# Patient Record
Sex: FEMALE | Race: WHITE | NOT HISPANIC OR LATINO | Employment: FULL TIME | URBAN - METROPOLITAN AREA
[De-identification: names, ages, dates, MRNs, and addresses within clinical notes are randomized per-mention and may not be internally consistent; named-entity substitution may affect disease eponyms.]

---
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
## 2018-06-12 ENCOUNTER — OFFICE VISIT (OUTPATIENT)
Dept: URGENT CARE | Facility: MEDICAL CENTER | Age: 18
End: 2018-06-12
Payer: COMMERCIAL

## 2018-06-12 VITALS
OXYGEN SATURATION: 98 % | SYSTOLIC BLOOD PRESSURE: 102 MMHG | WEIGHT: 147.6 LBS | HEART RATE: 70 BPM | RESPIRATION RATE: 20 BRPM | TEMPERATURE: 98 F | DIASTOLIC BLOOD PRESSURE: 68 MMHG

## 2018-06-12 DIAGNOSIS — R39.9 UTI SYMPTOMS: Primary | ICD-10-CM

## 2018-06-12 LAB
SL AMB  POCT GLUCOSE, UA: ABNORMAL
SL AMB LEUKOCYTE ESTERASE,UA: ABNORMAL
SL AMB POCT BILIRUBIN,UA: ABNORMAL
SL AMB POCT BLOOD,UA: ABNORMAL
SL AMB POCT CLARITY,UA: ABNORMAL
SL AMB POCT COLOR,UA: ABNORMAL
SL AMB POCT KETONES,UA: ABNORMAL
SL AMB POCT NITRITE,UA: ABNORMAL
SL AMB POCT PH,UA: 6
SL AMB POCT SPECIFIC GRAVITY,UA: 1.02
SL AMB POCT URINE HCG: NORMAL
SL AMB POCT URINE PROTEIN: ABNORMAL
SL AMB POCT UROBILINOGEN: 0.2

## 2018-06-12 PROCEDURE — 87086 URINE CULTURE/COLONY COUNT: CPT

## 2018-06-12 PROCEDURE — 99283 EMERGENCY DEPT VISIT LOW MDM: CPT

## 2018-06-12 PROCEDURE — 87077 CULTURE AEROBIC IDENTIFY: CPT

## 2018-06-12 PROCEDURE — 87186 SC STD MICRODIL/AGAR DIL: CPT

## 2018-06-12 PROCEDURE — G0382 LEV 3 HOSP TYPE B ED VISIT: HCPCS

## 2018-06-12 RX ORDER — SULFAMETHOXAZOLE AND TRIMETHOPRIM 800; 160 MG/1; MG/1
1 TABLET ORAL EVERY 12 HOURS SCHEDULED
Qty: 6 TABLET | Refills: 0 | Status: SHIPPED | OUTPATIENT
Start: 2018-06-12 | End: 2018-06-15

## 2018-06-12 RX ORDER — MEDROXYPROGESTERONE ACETATE 150 MG/ML
150 INJECTION, SUSPENSION INTRAMUSCULAR
COMMUNITY
Start: 2017-12-13 | End: 2018-12-08

## 2018-06-12 NOTE — PATIENT INSTRUCTIONS
Take antibiotic as directed  Eat yogurt to avoid GI upset  Increase fluid intake  Avoid holding bladder for prolonged periods  Urinate after intercourse  Always wipe front to back  Go to the ER for worsening symptoms: flank pain, fever/chills, nausea/vomiting, lower back pain or any other concerning symptoms

## 2018-06-12 NOTE — PROGRESS NOTES
[facility]        NAME: Ac Allen is a 19 y o  female  : 2000    MRN: [id number]      Assessment and Plan   UTI symptoms [R39 9]  1  UTI symptoms  Urine culture    POCT urine HCG    sulfamethoxazole-trimethoprim (BACTRIM DS) 800-160 mg per tablet         Patient Instructions     Patient Instructions   Take antibiotic as directed  Eat yogurt to avoid GI upset  Increase fluid intake  Avoid holding bladder for prolonged periods  Urinate after intercourse  Always wipe front to back  Go to the ER for worsening symptoms: flank pain, fever/chills, nausea/vomiting, lower back pain or any other concerning symptoms  Follow up with PCP in 3-5 days  Proceed to  ER if symptoms worsen  Chief Complaint     Chief Complaint   Patient presents with    Possible UTI     Pressure, hematuria, urgency, X 2 days  Is premenstral          History of Present Illness       Urinary Tract Infection    This is a new problem  The current episode started in the past 7 days  The problem occurs every urination  The problem has been unchanged  Quality: pressure in lower abdomen  The pain is mild  There has been no fever  She is sexually active (One partner of one year  No sympotms in partner)  There is no history of pyelonephritis  Associated symptoms include frequency, hematuria and urgency  Pertinent negatives include no chills, discharge, flank pain, hesitancy, nausea, possible pregnancy, sweats or vomiting  Associated symptoms comments: Pt is on depo shot  Reports she just has occasional spotting    There is no history of kidney stones, recurrent UTIs or a single kidney  Review of Systems   Review of Systems   Constitutional: Negative for chills  Respiratory: Negative  Cardiovascular: Negative  Gastrointestinal: Negative for nausea and vomiting  Genitourinary: Positive for frequency, hematuria and urgency  Negative for flank pain and hesitancy           Current Medications       Current Outpatient Prescriptions:     medroxyPROGESTERone (DEPO-PROVERA) 150 mg/mL injection, Inject 150 mg into the shoulder, thigh, or buttocks, Disp: , Rfl:     sulfamethoxazole-trimethoprim (BACTRIM DS) 800-160 mg per tablet, Take 1 tablet by mouth every 12 (twelve) hours for 3 days, Disp: 6 tablet, Rfl: 0    Current Allergies     Allergies as of 06/12/2018 - Reviewed 08/07/2017   Allergen Reaction Noted    Amoxicillin Hives 08/17/2016            The following portions of the patient's history were reviewed and updated as appropriate: allergies, current medications, past family history, past medical history, past social history, past surgical history and problem list      History reviewed  No pertinent past medical history  History reviewed  No pertinent surgical history  Family History   Problem Relation Age of Onset    No Known Problems Mother     No Known Problems Father          Medications have been verified  Objective   /68 (BP Location: Left arm, Patient Position: Sitting, Cuff Size: Standard)   Pulse 70   Temp 98 °F (36 7 °C) (Temporal)   Resp 20   Wt 67 kg (147 lb 9 6 oz)   SpO2 98%      Urine HCG: negative  Urine dip: pos leukocytes and nitrates  ( see labs)     Physical Exam     Physical Exam   Constitutional: She is oriented to person, place, and time  She appears well-developed and well-nourished  Cardiovascular: Normal rate, regular rhythm and normal heart sounds  No murmur heard  Pulmonary/Chest: Effort normal and breath sounds normal  No respiratory distress  Abdominal: Soft  Normal appearance and bowel sounds are normal  She exhibits no distension  There is tenderness in the suprapubic area  There is no rigidity, no rebound, no guarding, no CVA tenderness, no tenderness at McBurney's point and negative Trinidad's sign  Neurological: She is alert and oriented to person, place, and time  Psychiatric: She has a normal mood and affect     Nursing note and vitals reviewed

## 2018-06-14 LAB — BACTERIA UR CULT: ABNORMAL

## 2018-06-15 ENCOUNTER — TELEPHONE (OUTPATIENT)
Dept: URGENT CARE | Facility: MEDICAL CENTER | Age: 18
End: 2018-06-15

## 2018-06-18 ENCOUNTER — TELEPHONE (OUTPATIENT)
Dept: URGENT CARE | Facility: MEDICAL CENTER | Age: 18
End: 2018-06-18

## 2018-06-18 RX ORDER — NITROFURANTOIN 25; 75 MG/1; MG/1
100 CAPSULE ORAL 2 TIMES DAILY
Qty: 14 CAPSULE | Refills: 0 | Status: SHIPPED | OUTPATIENT
Start: 2018-06-18 | End: 2018-06-25

## 2018-06-18 NOTE — TELEPHONE ENCOUNTER
Patient called states she had 3 days of antibiotics but still had the symptoms   A prescription for macrobid sent

## 2018-08-22 ENCOUNTER — APPOINTMENT (OUTPATIENT)
Dept: URGENT CARE | Facility: MEDICAL CENTER | Age: 18
End: 2018-08-22
Payer: OTHER MISCELLANEOUS

## 2018-08-22 ENCOUNTER — APPOINTMENT (OUTPATIENT)
Dept: RADIOLOGY | Facility: MEDICAL CENTER | Age: 18
End: 2018-08-22
Payer: OTHER MISCELLANEOUS

## 2018-08-22 DIAGNOSIS — M25.561 ACUTE PAIN OF RIGHT KNEE: ICD-10-CM

## 2018-08-22 DIAGNOSIS — M25.561 ACUTE PAIN OF RIGHT KNEE: Primary | ICD-10-CM

## 2018-08-22 PROCEDURE — 73564 X-RAY EXAM KNEE 4 OR MORE: CPT

## 2018-08-22 PROCEDURE — G0382 LEV 3 HOSP TYPE B ED VISIT: HCPCS

## 2018-08-22 PROCEDURE — 99283 EMERGENCY DEPT VISIT LOW MDM: CPT

## 2018-09-04 ENCOUNTER — APPOINTMENT (OUTPATIENT)
Dept: URGENT CARE | Facility: MEDICAL CENTER | Age: 18
End: 2018-09-04

## 2018-09-17 ENCOUNTER — APPOINTMENT (OUTPATIENT)
Dept: URGENT CARE | Facility: MEDICAL CENTER | Age: 18
End: 2018-09-17
Payer: OTHER MISCELLANEOUS

## 2018-09-17 PROCEDURE — 99213 OFFICE O/P EST LOW 20 MIN: CPT | Performed by: PHYSICIAN ASSISTANT

## 2018-09-21 ENCOUNTER — APPOINTMENT (OUTPATIENT)
Dept: OCCUPATIONAL MEDICINE | Facility: CLINIC | Age: 18
End: 2018-09-21
Payer: OTHER MISCELLANEOUS

## 2018-09-21 PROCEDURE — 99213 OFFICE O/P EST LOW 20 MIN: CPT | Performed by: PREVENTIVE MEDICINE

## 2018-11-07 ENCOUNTER — OFFICE VISIT (OUTPATIENT)
Dept: URGENT CARE | Facility: MEDICAL CENTER | Age: 18
End: 2018-11-07

## 2018-11-07 VITALS
HEIGHT: 66 IN | DIASTOLIC BLOOD PRESSURE: 73 MMHG | HEART RATE: 80 BPM | SYSTOLIC BLOOD PRESSURE: 110 MMHG | BODY MASS INDEX: 22.5 KG/M2 | OXYGEN SATURATION: 100 % | WEIGHT: 140 LBS | TEMPERATURE: 98.1 F | RESPIRATION RATE: 20 BRPM

## 2018-11-07 DIAGNOSIS — J06.9 UPPER RESPIRATORY INFECTION, VIRAL: Primary | ICD-10-CM

## 2018-11-07 RX ORDER — NAPROXEN 500 MG/1
TABLET ORAL
COMMUNITY
Start: 2018-09-04 | End: 2020-04-07

## 2018-11-07 RX ORDER — FLUTICASONE PROPIONATE 50 MCG
1 SPRAY, SUSPENSION (ML) NASAL DAILY
Qty: 16 G | Refills: 0 | Status: SHIPPED | OUTPATIENT
Start: 2018-11-07

## 2018-11-07 RX ORDER — CYCLOBENZAPRINE HCL 5 MG
TABLET ORAL
COMMUNITY
Start: 2018-08-22 | End: 2020-04-07

## 2018-11-07 RX ORDER — PSEUDOEPHEDRINE HCL 60 MG/1
60 TABLET ORAL EVERY 6 HOURS PRN
Qty: 30 TABLET | Refills: 0 | Status: SHIPPED | OUTPATIENT
Start: 2018-11-07 | End: 2020-04-07

## 2018-11-07 NOTE — LETTER
November 7, 2018     Patient: Carol Ann Sauceda   YOB: 2000   Date of Visit: 11/7/2018       To Whom it May Concern:    CarolA nn Sauceda is under my professional care  She was seen in my office on 11/7/2018  She  may return to work when she is fever and symptom free  If you have any questions or concerns, please don't hesitate to call           Sincerely,          Nafisa Arias PA-C        CC: No Recipients

## 2018-11-07 NOTE — PROGRESS NOTES
[facility]        NAME: David Tirado is a 19 y o  female  : 2000    MRN: [id number]  DATE: 2018  TIME: 11:47 AM    Assessment and Plan   Upper respiratory infection, viral [J06 9]  1  Upper respiratory infection, viral  pseudoephedrine (SUDAFED) 60 mg tablet    fluticasone (FLONASE) 50 mcg/act nasal spray         Patient Instructions   Prescription sent to pharmacy for pseudoephedrine and Flonase-use as directed  Saline nasal spray, cool mist humidifier, rest, fluids  Tylenol/ibuprofen as needed for fever or pain  Follow up with PCP in 3-5 days  Proceed to  ER if symptoms worsen  Chief Complaint     Chief Complaint   Patient presents with    Sinusitis     started with facial pressure last night ,          History of Present Illness   The patient is a 43-year-old female who presents with sinus congestion and pressure and other cold symptoms for 1 day  Positive nasal and sinus congestion with thick nasal drainage  Sinus pressure and pain bilaterally  Positive headache  She denies dizziness  Negative jaw or dental pain  Positive nonproductive cough  Negative shortness of breath or wheezing  Negative abdominal pain, nausea, vomiting or diarrhea  Negative fever or chills  Negative myalgias  HPI    Review of Systems   Review of Systems   Constitutional: Negative for activity change, chills, fatigue and fever  HENT: Positive for congestion, postnasal drip, rhinorrhea, sinus pain, sinus pressure and sore throat  Negative for ear discharge, ear pain, facial swelling, mouth sores, sneezing and trouble swallowing  Eyes: Negative for pain, discharge, redness and itching  Respiratory: Positive for cough  Negative for apnea, chest tightness, shortness of breath, wheezing and stridor  Cardiovascular: Negative for chest pain  Gastrointestinal: Negative for abdominal distention, abdominal pain, diarrhea, nausea and vomiting     Genitourinary: Negative for difficulty urinating  Musculoskeletal: Negative for arthralgias and myalgias  Skin: Negative for pallor and rash  Allergic/Immunologic: Negative  Neurological: Positive for headaches  Negative for dizziness and light-headedness  Hematological: Negative  Psychiatric/Behavioral: Negative  All other systems reviewed and are negative  Current Medications       Current Outpatient Prescriptions:     cyclobenzaprine (FLEXERIL) 5 mg tablet, , Disp: , Rfl:     fluticasone (FLONASE) 50 mcg/act nasal spray, 1 spray into each nostril daily, Disp: 16 g, Rfl: 0    medroxyPROGESTERone (DEPO-PROVERA) 150 mg/mL injection, Inject 150 mg into the shoulder, thigh, or buttocks, Disp: , Rfl:     naproxen (NAPROSYN) 500 mg tablet, , Disp: , Rfl:     pseudoephedrine (SUDAFED) 60 mg tablet, Take 1 tablet (60 mg total) by mouth every 6 (six) hours as needed for congestion, Disp: 30 tablet, Rfl: 0    Current Allergies     Allergies as of 11/07/2018 - Reviewed 11/07/2018   Allergen Reaction Noted    Amoxicillin Hives 08/17/2016            The following portions of the patient's history were reviewed and updated as appropriate: allergies, current medications, past family history, past medical history, past social history, past surgical history and problem list      History reviewed  No pertinent past medical history  History reviewed  No pertinent surgical history  Family History   Problem Relation Age of Onset    No Known Problems Mother     No Known Problems Father          Medications have been verified  Objective   /73   Pulse 80   Temp 98 1 °F (36 7 °C) (Temporal)   Resp 20   Ht 5' 5 5" (1 664 m)   Wt 63 5 kg (140 lb)   SpO2 100%   BMI 22 94 kg/m²        Physical Exam     Physical Exam   Constitutional: She is oriented to person, place, and time  She appears well-developed and well-nourished  She appears ill  No distress  HENT:   Head: Normocephalic and atraumatic     Right Ear: Hearing, tympanic membrane, external ear and ear canal normal  No drainage or tenderness  Tympanic membrane is not perforated, not erythematous, not retracted and not bulging  No middle ear effusion  No decreased hearing is noted  Left Ear: Hearing, tympanic membrane, external ear and ear canal normal  No drainage or tenderness  Tympanic membrane is not perforated, not erythematous, not retracted and not bulging  No middle ear effusion  No decreased hearing is noted  Nose: Rhinorrhea present  No mucosal edema or septal deviation  Right sinus exhibits maxillary sinus tenderness and frontal sinus tenderness  Left sinus exhibits maxillary sinus tenderness and frontal sinus tenderness  Mouth/Throat: Uvula is midline and mucous membranes are normal  No oral lesions  No dental abscesses or uvula swelling  Posterior oropharyngeal erythema present  No oropharyngeal exudate, posterior oropharyngeal edema or tonsillar abscesses  Eyes: Pupils are equal, round, and reactive to light  Conjunctivae and EOM are normal    Neck: Trachea normal, normal range of motion and full passive range of motion without pain  Neck supple  No JVD present  No edema and no erythema present  No thyromegaly present  Cardiovascular: Normal rate, regular rhythm, S1 normal, S2 normal, normal heart sounds, intact distal pulses and normal pulses  No murmur heard  Pulmonary/Chest: Effort normal and breath sounds normal  No accessory muscle usage or stridor  No respiratory distress  She has no wheezes  Abdominal: Soft  Normal appearance and bowel sounds are normal  She exhibits no distension  There is no hepatosplenomegaly  There is no tenderness  Musculoskeletal: Normal range of motion  She exhibits no edema  Neurological: She is alert and oriented to person, place, and time  Skin: Skin is warm, dry and intact  No abrasion, no lesion and no rash noted  She is not diaphoretic  No cyanosis or erythema  Nails show no clubbing     Psychiatric: She has a normal mood and affect  Her speech is normal and behavior is normal    Nursing note and vitals reviewed

## 2018-11-07 NOTE — PATIENT INSTRUCTIONS
Prescription sent to pharmacy for pseudoephedrine and Flonase-use as directed  Saline nasal spray, cool mist humidifier, rest, fluids  Tylenol/ibuprofen as needed for fever or pain  Follow up with PCP in 3-5 days  Proceed to  ER if symptoms worsen  Cold Symptoms   AMBULATORY CARE:   Cold symptoms  include sneezing, dry throat, a stuffy nose, headache, watery eyes, and a cough  Your cough may be dry, or you may cough up mucus  You may also have muscle aches, joint pain, and tiredness  Rarely, you may have a fever  Cold symptoms occur from inflammation in your upper respiratory system caused by a virus  Most colds go away without treatment  Seek care immediately if:   · You have increased tiredness and weakness  · You are unable to eat  · Your heart is beating much faster than usual for you  · You see white spots in the back of your throat and your neck is swollen and sore to the touch  · You see pinpoint or larger reddish-purple dots on your skin  Contact your healthcare provider if:   · You have a fever higher than 102°F (38 9°C)  · You have new or worsening shortness of breath  · You have thick nasal drainage for more than 2 days  · Your symptoms do not improve or get worse within 5 days  · You have questions or concerns about your condition or care  Treatment for cold symptoms  may include NSAIDS to decrease muscle aches and fever  Cold medicines may also be given to decrease coughing, nasal stuffiness, sneezing, and a runny nose  Manage your cold symptoms: The following may help relieve cold symptoms, such as a dry throat and congestion:  · Gargle with mouthwash or warm salt water as directed  · Suck on throat lozenges or hard candy  · Use a cold or warm vaporizer or humidifier to ease your breathing  · Rest for at least 2 days and then as needed to decrease tiredness and weakness       · Use petroleum based jelly around your nostrils to decrease irritation from blowing your nose  · Drink plenty of liquids  Liquids will help thin and loosen thick mucus so you can cough it up  Liquids will also keep you hydrated  Ask your healthcare provider which liquids are best for you and how much to drink each day  Prevent the spread of germs  by washing your hands often  You can spread your cold germs to others for at least 3 days after your symptoms start  Do not share items, such as eating utensils  Cover your nose and mouth when you cough or sneeze using the crook of your elbow instead of your hands  Throw used tissues in the garbage  Do not smoke:  Smoking may worsen your symptoms and increase the length of time you feel sick  Talk with your healthcare provider if you need help to stop smoking  Follow up with your healthcare provider as directed:  Write down your questions so you remember to ask them during your visits  © 2017 [employer] Information is for End User's use only and may not be sold, redistributed or otherwise used for commercial purposes  All illustrations and images included in CareNotes® are the copyrighted property of A D A M , Inc  or Jose Balderrama  The above information is an  only  It is not intended as medical advice for individual conditions or treatments  Talk to your doctor, nurse or pharmacist before following any medical regimen to see if it is safe and effective for you

## 2018-11-12 ENCOUNTER — OFFICE VISIT (OUTPATIENT)
Dept: URGENT CARE | Facility: MEDICAL CENTER | Age: 18
End: 2018-11-12
Payer: COMMERCIAL

## 2018-11-12 VITALS
HEIGHT: 65 IN | WEIGHT: 145 LBS | TEMPERATURE: 97.3 F | RESPIRATION RATE: 16 BRPM | SYSTOLIC BLOOD PRESSURE: 102 MMHG | DIASTOLIC BLOOD PRESSURE: 65 MMHG | HEART RATE: 65 BPM | OXYGEN SATURATION: 99 % | BODY MASS INDEX: 24.16 KG/M2

## 2018-11-12 DIAGNOSIS — J01.10 ACUTE NON-RECURRENT FRONTAL SINUSITIS: Primary | ICD-10-CM

## 2018-11-12 PROCEDURE — G0382 LEV 3 HOSP TYPE B ED VISIT: HCPCS | Performed by: PHYSICIAN ASSISTANT

## 2018-11-12 PROCEDURE — 99283 EMERGENCY DEPT VISIT LOW MDM: CPT | Performed by: PHYSICIAN ASSISTANT

## 2018-11-12 RX ORDER — AZITHROMYCIN 250 MG/1
TABLET, FILM COATED ORAL
Qty: 6 TABLET | Refills: 0 | Status: SHIPPED | OUTPATIENT
Start: 2018-11-12 | End: 2018-11-16

## 2018-11-12 NOTE — PROGRESS NOTES
[facility]        NAME: Irene Concepcion is a 19 y o  female  : 2000    MRN: [id number]  DATE: 2018  TIME: 11:48 AM    Assessment and Plan   Acute non-recurrent frontal sinusitis [J01 10]  1  Acute non-recurrent frontal sinusitis           Patient Instructions     Sinusitis  zithromax as directed  Continue flonase  Follow up with PCP in 3-5 days  Proceed to  ER if symptoms worsen  Chief Complaint     Chief Complaint   Patient presents with    Nasal Congestion     x5 days with nasal congestion, post nasal drip and a productive cough (denies wheezing or sob), ears ringing at times  Denies fevers, headaches         History of Present Illness       20 y/o female c/o congestion, sinus pressure and pain  Patient states she was treated for URI 5 days ago but the symptoms have worsen and now has pain to sinuses        Review of Systems   Review of Systems   Constitutional: Positive for fever  Negative for activity change, appetite change, chills, diaphoresis and fatigue  HENT: Positive for congestion, rhinorrhea, sinus pain and sinus pressure  Negative for ear discharge, ear pain, facial swelling, hearing loss, mouth sores, nosebleeds, postnasal drip, sneezing, sore throat and voice change  Respiratory: Positive for cough  Negative for apnea, choking, chest tightness, shortness of breath, wheezing and stridor  Cardiovascular: Negative            Current Medications       Current Outpatient Prescriptions:     fluticasone (FLONASE) 50 mcg/act nasal spray, 1 spray into each nostril daily, Disp: 16 g, Rfl: 0    medroxyPROGESTERone (DEPO-PROVERA) 150 mg/mL injection, Inject 150 mg into the shoulder, thigh, or buttocks, Disp: , Rfl:     pseudoephedrine (SUDAFED) 60 mg tablet, Take 1 tablet (60 mg total) by mouth every 6 (six) hours as needed for congestion, Disp: 30 tablet, Rfl: 0    cyclobenzaprine (FLEXERIL) 5 mg tablet, , Disp: , Rfl:     naproxen (NAPROSYN) 500 mg tablet, , Disp: , Rfl:     Current Allergies     Allergies as of 11/12/2018 - Reviewed 11/12/2018   Allergen Reaction Noted    Amoxicillin Hives 08/17/2016            The following portions of the patient's history were reviewed and updated as appropriate: allergies, current medications, past family history, past medical history, past social history, past surgical history and problem list      History reviewed  No pertinent past medical history  History reviewed  No pertinent surgical history  Family History   Problem Relation Age of Onset    No Known Problems Mother     No Known Problems Father          Medications have been verified  Objective   /65   Pulse 65   Temp (!) 97 3 °F (36 3 °C) (Temporal)   Resp 16   Ht 5' 5" (1 651 m)   Wt 65 8 kg (145 lb)   SpO2 99%   BMI 24 13 kg/m²        Physical Exam     Physical Exam   Constitutional: She appears well-developed and well-nourished  No distress  HENT:   Head: Normocephalic and atraumatic  Right Ear: Hearing, tympanic membrane, external ear and ear canal normal    Left Ear: Hearing, tympanic membrane, external ear and ear canal normal    Nose: Right sinus exhibits maxillary sinus tenderness  Left sinus exhibits maxillary sinus tenderness  Mouth/Throat: Uvula is midline and mucous membranes are normal  No trismus in the jaw  No uvula swelling  Posterior oropharyngeal erythema present  No oropharyngeal exudate or tonsillar abscesses  Neck: Normal range of motion  Neck supple  Cardiovascular: Normal rate, regular rhythm, normal heart sounds and intact distal pulses  Pulmonary/Chest: Effort normal and breath sounds normal    Lymphadenopathy:     She has cervical adenopathy  Skin: She is not diaphoretic

## 2018-11-12 NOTE — PATIENT INSTRUCTIONS
SinusitisRhinosinusitis   WHAT YOU NEED TO KNOW:   Rhinosinusitis (RS) is inflammation of your nose and sinuses  It commonly begins as a virus, often as a common cold  Viruses usually last 7 to 10 days and do not need treatment  When the virus does not get better on its own, you may have bacterial RS  This means that bacteria have begun to grow inside your sinuses  Acute RS lasts less than 4 weeks  Chronic RS lasts 12 weeks or more  Recurrent RS is when you have 4 or more episodes of RS in one year  DISCHARGE INSTRUCTIONS:   Return to the emergency department if:   · Your eye and eyelid are red, swollen, and painful  · You cannot open your eye  · You have double vision or you cannot see  · Your eyeball bulges out or you cannot move your eye  · You are more sleepy than normal or you notice changes in your ability to think, move, or talk  · You have a stiff neck, a fever, or a bad headache  · You have swelling of your forehead or scalp  Contact your healthcare provider if:   · Your symptoms are worse or do not improve after 3 to 5 days of treatment  · You have questions or concerns about your condition or care  Medicines: You may need any of the following:  · Acetaminophen  decreases pain and fever  It is available without a doctor's order  Ask how much to take and how often to take it  Follow directions  Acetaminophen can cause liver damage if not taken correctly  · NSAIDs , such as ibuprofen, help decrease swelling, pain, and fever  This medicine is available with or without a doctor's order  NSAIDs can cause stomach bleeding or kidney problems in certain people  If you take blood thinner medicine, always ask your healthcare provider if NSAIDs are safe for you  Always read the medicine label and follow directions  · Nasal steroid sprays  decrease inflammation in your nose and sinuses  · Decongestants  reduce swelling and drain mucus in the nose and sinuses   They may help you breathe easier  · Antihistamines  dry mucus in the nose and relieve sneezing  · Antibiotics  treat a bacterial infection and may be needed if your symptoms do not improve or they get worse  · Take your medicine as directed  Contact your healthcare provider if you think your medicine is not helping or if you have side effects  Tell him or her if you are allergic to any medicine  Keep a list of the medicines, vitamins, and herbs you take  Include the amounts, and when and why you take them  Bring the list or the pill bottles to follow-up visits  Carry your medicine list with you in case of an emergency  Self-care:   · Rinse your sinuses  Use a sinus rinse device to rinse your nasal passages with a saline (salt water) solution  This will help thin the mucus in your nose and rinse away pollen and dirt  It will also help reduce swelling so you can breathe normally  Ask your healthcare provider how often to do this  · Breathe in steam   Heat a bowl of water until you see steam  Lean over the bowl and make a tent over your head with a large towel  Breathe deeply for about 20 minutes  Be careful not to get too close to the steam or burn yourself  Do this 3 times a day  You can also breathe deeply when you take a hot shower  · Sleep with your head elevated  Place an extra pillow under your head before you go to sleep to help your sinuses drain  · Drink liquids as directed  Ask your healthcare provider how much liquid to drink each day and which liquids are best for you  Liquids will thin the mucus in your nose and help it drain  Avoid drinks that contain alcohol or caffeine  · Do not smoke, and avoid secondhand smoke  Nicotine and other chemicals in cigarettes and cigars can make your symptoms worse  Ask your healthcare provider for information if you currently smoke and need help to quit  E-cigarettes or smokeless tobacco still contain nicotine   Talk to your healthcare provider before you use these products  Follow up with your healthcare provider as directed: Follow up if your symptoms are worse or not better after 3 to 5 days of treatment  Write down your questions so you remember to ask them during your visits  © 2017 [employer] Information is for End User's use only and may not be sold, redistributed or otherwise used for commercial purposes  All illustrations and images included in CareNotes® are the copyrighted property of [employer]  or Jose Balderrama  The above information is an  only  It is not intended as medical advice for individual conditions or treatments  Talk to your doctor, nurse or pharmacist before following any medical regimen to see if it is safe and effective for you     zithromax as directed  Continue flonase  Follow up with PCP in 3-5 days  Proceed to  ER if symptoms worsen

## 2018-11-12 NOTE — LETTER
November 12, 2018     Patient: Moisés Funk   YOB: 2000   Date of Visit: 11/12/2018       To Whom it May Concern:    Moisés Funk was seen in my clinic on 11/12/2018  She may return to work on 11/14/2018  If you have any questions or concerns, please don't hesitate to call           Sincerely,          [facility] [place]        CC: No Recipients

## 2018-12-05 ENCOUNTER — OFFICE VISIT (OUTPATIENT)
Dept: URGENT CARE | Facility: MEDICAL CENTER | Age: 18
End: 2018-12-05
Payer: COMMERCIAL

## 2018-12-05 VITALS — HEIGHT: 65 IN | TEMPERATURE: 99.1 F | WEIGHT: 146 LBS | BODY MASS INDEX: 24.32 KG/M2 | RESPIRATION RATE: 16 BRPM

## 2018-12-05 DIAGNOSIS — J06.9 VIRAL UPPER RESPIRATORY TRACT INFECTION: Primary | ICD-10-CM

## 2018-12-05 PROCEDURE — 99283 EMERGENCY DEPT VISIT LOW MDM: CPT | Performed by: FAMILY MEDICINE

## 2018-12-05 PROCEDURE — G0382 LEV 3 HOSP TYPE B ED VISIT: HCPCS | Performed by: FAMILY MEDICINE

## 2018-12-05 RX ORDER — BROMPHENIRAMINE MALEATE, PSEUDOEPHEDRINE HYDROCHLORIDE, AND DEXTROMETHORPHAN HYDROBROMIDE 2; 30; 10 MG/5ML; MG/5ML; MG/5ML
5 SYRUP ORAL 4 TIMES DAILY PRN
Qty: 120 ML | Refills: 0 | Status: SHIPPED | OUTPATIENT
Start: 2018-12-05 | End: 2018-12-10

## 2018-12-05 NOTE — PATIENT INSTRUCTIONS
Upper respiratory infection  bromfed as directed  Humidifier in room  Follow up with PCP in 3-5 days  Proceed to  ER if symptoms worsen  Cold Symptoms   WHAT YOU NEED TO KNOW:   A cold is an infection caused by a virus  The infection causes your upper respiratory system to become inflamed  Common symptoms of a cold include sneezing, dry throat, a stuffy nose, headache, watery eyes, and a cough  Your cough may be dry, or you may cough up mucus  You may also have muscle aches, joint pain, and tiredness  Rarely, you may have a fever  Most colds go away without treatment  DISCHARGE INSTRUCTIONS:   Return to the emergency department if:   · You have increased tiredness and weakness  · You are unable to eat  · Your heart is beating much faster than usual for you  · You see white spots in the back of your throat and your neck is swollen and sore to the touch  · You see pinpoint or larger reddish-purple dots on your skin  Contact your healthcare provider if:   · You have a fever higher than 102°F (38 9°C)  · You have new or worsening shortness of breath  · You have thick nasal drainage for more than 2 days  · Your symptoms do not improve or get worse within 5 days  · You have questions or concerns about your condition or care  Medicines: The following medicines may be suggested by your healthcare provider to decrease your cold symptoms  These medicines are available without a doctor's order  Ask which medicines to take and when to take them  Follow directions  · NSAIDs or acetaminophen  help to bring down a fever or decrease pain  · Decongestants  help decrease nasal stuffiness  · Antihistamines  help decrease sneezing and a runny nose  · Cough suppressants  help decrease how much you cough  · Expectorants  help loosen mucus so you can cough it up  · Take your medicine as directed    Contact your healthcare provider if you think your medicine is not helping or if you have side effects  Tell him of her if you are allergic to any medicine  Keep a list of the medicines, vitamins, and herbs you take  Include the amounts, and when and why you take them  Bring the list or the pill bottles to follow-up visits  Carry your medicine list with you in case of an emergency  Symptom relief: The following may help relieve cold symptoms, such as a dry throat and congestion:  · Gargle with mouthwash or warm salt water as directed  · Suck on throat lozenges or hard candy  · Use a cold or warm vaporizer or humidifier to ease your breathing  · Rest for at least 2 days and then as needed to decrease tiredness and weakness  · Use petroleum based jelly around your nostrils to decrease irritation from blowing your nose  Drink liquids:  Liquids will help thin and loosen thick mucus so you can cough it up  Liquids will also keep you hydrated  Ask your healthcare provider which liquids are best for you and how much to drink each day  Prevent the spread of germs: You can spread your cold germs to others for at least 3 days after your symptoms start  Wash your hands often  Do not share items, such as eating utensils  Cover your nose and mouth when you cough or sneeze using the crook of your elbow instead of your hands  Throw used tissues in the garbage  Do not smoke:  Smoking may worsen your symptoms and increase the length of time you feel sick  Talk with your healthcare provider if you need help to stop smoking  Follow up with your healthcare provider as directed:  Write down your questions so you remember to ask them during your visits  © 2017 [employer] Information is for End User's use only and may not be sold, redistributed or otherwise used for commercial purposes  All illustrations and images included in CareNotes® are the copyrighted property of [employer] , [employer]  or Jose Balderrama  The above information is an  only   It is not intended as medical advice for individual conditions or treatments  Talk to your doctor, nurse or pharmacist before following any medical regimen to see if it is safe and effective for you

## 2018-12-05 NOTE — PROGRESS NOTES
[facility]        NAME: Moisés Funk is a 19 y o  female  : 2000    MRN: [id number]  DATE: 2018  TIME: 5:50 PM    Assessment and Plan   Viral upper respiratory tract infection [J06 9]  1  Viral upper respiratory tract infection  brompheniramine-pseudoephedrine-DM 30-2-10 MG/5ML syrup         Patient Instructions     Upper respiratory infection  bromfed as directed  Humidifier in room  Follow up with PCP in 3-5 days  Proceed to  ER if symptoms worsen  Chief Complaint     Chief Complaint   Patient presents with    Nasal Congestion     face pressure, afebrile, sx started last night         History of Present Illness       18 y/o female presents c/o cough productive of white sputum and nasal congestion x 1 day  Denies fever, chills, n/v, chest pain, SOB        Review of Systems   Review of Systems   Constitutional: Negative for activity change, appetite change, chills, diaphoresis, fatigue and fever  HENT: Positive for congestion, rhinorrhea and sore throat  Negative for ear discharge, ear pain, facial swelling, hearing loss, mouth sores, nosebleeds, postnasal drip, sinus pain, sinus pressure, sneezing and voice change  Respiratory: Positive for cough  Negative for apnea, choking, chest tightness, shortness of breath, wheezing and stridor  Cardiovascular: Negative            Current Medications       Current Outpatient Prescriptions:     fluticasone (FLONASE) 50 mcg/act nasal spray, 1 spray into each nostril daily, Disp: 16 g, Rfl: 0    medroxyPROGESTERone (DEPO-PROVERA) 150 mg/mL injection, Inject 150 mg into the shoulder, thigh, or buttocks, Disp: , Rfl:     brompheniramine-pseudoephedrine-DM 30-2-10 MG/5ML syrup, Take 5 mL by mouth 4 (four) times a day as needed for congestion or cough for up to 5 days, Disp: 120 mL, Rfl: 0    cyclobenzaprine (FLEXERIL) 5 mg tablet, , Disp: , Rfl:     naproxen (NAPROSYN) 500 mg tablet, , Disp: , Rfl:     pseudoephedrine (SUDAFED) 60 mg tablet, Take 1 tablet (60 mg total) by mouth every 6 (six) hours as needed for congestion (Patient not taking: Reported on 12/5/2018 ), Disp: 30 tablet, Rfl: 0    Current Allergies     Allergies as of 12/05/2018 - Reviewed 12/05/2018   Allergen Reaction Noted    Amoxicillin Hives 08/17/2016            The following portions of the patient's history were reviewed and updated as appropriate: allergies, current medications, past family history, past medical history, past social history, past surgical history and problem list      No past medical history on file  No past surgical history on file  Family History   Problem Relation Age of Onset    No Known Problems Mother     No Known Problems Father          Medications have been verified  Objective   Temp 99 1 °F (37 3 °C) (Temporal)   Resp 16   Ht 5' 5" (1 651 m)   Wt 66 2 kg (146 lb)   BMI 24 30 kg/m²        Physical Exam     Physical Exam   Constitutional: She appears well-developed and well-nourished  No distress  HENT:   Head: Normocephalic and atraumatic  Right Ear: Hearing, tympanic membrane, external ear and ear canal normal    Left Ear: Hearing, tympanic membrane, external ear and ear canal normal    Nose: Rhinorrhea present  Mouth/Throat: Uvula is midline, oropharynx is clear and moist and mucous membranes are normal    Neck: Normal range of motion  Neck supple  Cardiovascular: Normal rate, regular rhythm, normal heart sounds and intact distal pulses  Pulmonary/Chest: Effort normal and breath sounds normal    Lymphadenopathy:     She has cervical adenopathy  Skin: She is not diaphoretic

## 2018-12-20 ENCOUNTER — OFFICE VISIT (OUTPATIENT)
Dept: URGENT CARE | Facility: MEDICAL CENTER | Age: 18
End: 2018-12-20
Payer: COMMERCIAL

## 2018-12-20 VITALS
BODY MASS INDEX: 24.39 KG/M2 | DIASTOLIC BLOOD PRESSURE: 61 MMHG | TEMPERATURE: 97.5 F | HEIGHT: 65 IN | SYSTOLIC BLOOD PRESSURE: 126 MMHG | HEART RATE: 86 BPM | RESPIRATION RATE: 20 BRPM | WEIGHT: 146.38 LBS

## 2018-12-20 DIAGNOSIS — G43.909 MIGRAINE WITHOUT STATUS MIGRAINOSUS, NOT INTRACTABLE, UNSPECIFIED MIGRAINE TYPE: Primary | ICD-10-CM

## 2018-12-20 PROCEDURE — 99283 EMERGENCY DEPT VISIT LOW MDM: CPT | Performed by: PHYSICIAN ASSISTANT

## 2018-12-20 PROCEDURE — G0382 LEV 3 HOSP TYPE B ED VISIT: HCPCS | Performed by: PHYSICIAN ASSISTANT

## 2018-12-20 RX ORDER — SUMATRIPTAN 25 MG/1
25 TABLET, FILM COATED ORAL ONCE AS NEEDED
Qty: 9 TABLET | Refills: 0 | Status: SHIPPED | OUTPATIENT
Start: 2018-12-20 | End: 2020-04-07

## 2018-12-20 NOTE — PATIENT INSTRUCTIONS
1  Take Imitrex 25mg  At onset of headache  2  Increase fluids  3  Recommend decrease/eliminate caffeine  4  Follow up with PCP in 3-5 days if headaches persist   5  Proceed to  ER if symptoms worsen

## 2018-12-20 NOTE — PROGRESS NOTES
[facility]        NAME: Kings Mariano is a 19 y o  female  : 2000    MRN: [id number]  DATE: 2018  TIME: 2:18 PM    Assessment and Plan   Migraine without status migrainosus, not intractable, unspecified migraine type [G43 909]  1  Migraine without status migrainosus, not intractable, unspecified migraine type  SUMAtriptan (IMITREX) 25 mg tablet         Patient Instructions     1  Take Imitrex 25mg  At onset of headache  2  Increase fluids  3  Recommend decrease/eliminate caffeine  4  Follow up with PCP in 3-5 days if headaches persist   5  Proceed to  ER if symptoms worsen  Chief Complaint     Chief Complaint   Patient presents with    Headache     freq  migraines for weeks , exerdine migraine is only med that works  taking everyday   History of Present Illness       Patient is a 57-year-old female presents with a 2 week history of persistent migraine headaches  She states her headaches are almost daily, she uses Excedrin which does really offer some relief of her symptoms  Patient denies any nocturnal or 1st morning headaches, she has had several episodes of vomiting since the onset of her symptoms  Review of Systems   Review of Systems   Constitutional: Negative  HENT: Negative  Neurological: Positive for headaches  Negative for dizziness, speech difficulty, weakness and numbness           Current Medications       Current Outpatient Prescriptions:     fluticasone (FLONASE) 50 mcg/act nasal spray, 1 spray into each nostril daily, Disp: 16 g, Rfl: 0    cyclobenzaprine (FLEXERIL) 5 mg tablet, , Disp: , Rfl:     medroxyPROGESTERone (DEPO-PROVERA) 150 mg/mL injection, Inject 150 mg into the shoulder, thigh, or buttocks, Disp: , Rfl:     naproxen (NAPROSYN) 500 mg tablet, , Disp: , Rfl:     pseudoephedrine (SUDAFED) 60 mg tablet, Take 1 tablet (60 mg total) by mouth every 6 (six) hours as needed for congestion (Patient not taking: Reported on 2018 ), Disp: 30 tablet, Rfl: 0    SUMAtriptan (IMITREX) 25 mg tablet, Take 1 tablet (25 mg total) by mouth once as needed for migraine for up to 1 dose, Disp: 9 tablet, Rfl: 0    Current Allergies     Allergies as of 12/20/2018 - Reviewed 12/20/2018   Allergen Reaction Noted    Amoxicillin Hives 08/17/2016            The following portions of the patient's history were reviewed and updated as appropriate: allergies, current medications, past family history, past medical history, past social history, past surgical history and problem list      History reviewed  No pertinent past medical history  History reviewed  No pertinent surgical history  Family History   Problem Relation Age of Onset    No Known Problems Mother     No Known Problems Father          Medications have been verified  Objective   /61   Pulse 86   Temp 97 5 °F (36 4 °C) (Temporal)   Resp 20   Ht 5' 5" (1 651 m)   Wt 66 4 kg (146 lb 6 oz)   BMI 24 36 kg/m²        Physical Exam     Physical Exam   Constitutional: She is oriented to person, place, and time  She appears well-developed and well-nourished  No distress  HENT:   Head: Normocephalic and atraumatic  Right Ear: Tympanic membrane and ear canal normal    Left Ear: Tympanic membrane and ear canal normal    Nose: Nose normal    Mouth/Throat: Uvula is midline, oropharynx is clear and moist and mucous membranes are normal    Eyes: Pupils are equal, round, and reactive to light  Conjunctivae are normal  Right eye exhibits no nystagmus  Left eye exhibits no nystagmus  Cardiovascular: Normal rate, regular rhythm and normal heart sounds  No murmur heard  Pulmonary/Chest: Effort normal and breath sounds normal    Neurological: She is alert and oriented to person, place, and time  She has normal strength  No cranial nerve deficit or sensory deficit  Reflex Scores:       Bicep reflexes are 2+ on the right side and 2+ on the left side         Patellar reflexes are 2+ on the right side and 2+ on the left side

## 2018-12-20 NOTE — LETTER
December 20, 2018     Patient: Burgess Elias   YOB: 2000   Date of Visit: 12/20/2018       To Whom It May Concern: It is my medical opinion that Burgess Elias may return to work on 12/21/2018  If you have any questions or concerns, please don't hesitate to call           Sincerely,        Brunilda Rod PA-C    CC: Warren Jada

## 2019-01-14 ENCOUNTER — OFFICE VISIT (OUTPATIENT)
Dept: URGENT CARE | Facility: MEDICAL CENTER | Age: 19
End: 2019-01-14
Payer: COMMERCIAL

## 2019-01-14 VITALS
HEART RATE: 69 BPM | HEIGHT: 66 IN | WEIGHT: 154.4 LBS | RESPIRATION RATE: 18 BRPM | SYSTOLIC BLOOD PRESSURE: 98 MMHG | BODY MASS INDEX: 24.81 KG/M2 | OXYGEN SATURATION: 95 % | DIASTOLIC BLOOD PRESSURE: 64 MMHG | TEMPERATURE: 98.5 F

## 2019-01-14 DIAGNOSIS — J02.9 ACUTE VIRAL PHARYNGITIS: Primary | ICD-10-CM

## 2019-01-14 LAB — S PYO AG THROAT QL: NEGATIVE

## 2019-01-14 PROCEDURE — 99283 EMERGENCY DEPT VISIT LOW MDM: CPT | Performed by: PHYSICIAN ASSISTANT

## 2019-01-14 PROCEDURE — G0382 LEV 3 HOSP TYPE B ED VISIT: HCPCS | Performed by: PHYSICIAN ASSISTANT

## 2019-01-14 PROCEDURE — 99213 OFFICE O/P EST LOW 20 MIN: CPT | Performed by: PHYSICIAN ASSISTANT

## 2019-01-14 PROCEDURE — 87430 STREP A AG IA: CPT | Performed by: PHYSICIAN ASSISTANT

## 2019-01-14 RX ORDER — MEDROXYPROGESTERONE ACETATE 150 MG/ML
150 INJECTION, SUSPENSION INTRAMUSCULAR
COMMUNITY
Start: 2018-12-26 | End: 2020-04-07

## 2019-01-14 NOTE — PATIENT INSTRUCTIONS
Pharyngitis  Warm water gargles  Magic mouth as directed  Follow up with PCP in 3-5 days  Proceed to  ER if symptoms worsen  Pharyngitis   WHAT YOU NEED TO KNOW:   Pharyngitis, or sore throat, is inflammation of the tissues and structures in your pharynx (throat)  Pharyngitis is most often caused by bacteria  It may also be caused by a cold or flu virus  Other causes include smoking, allergies, or acid reflux  DISCHARGE INSTRUCTIONS:   Call 911 for any of the following:   · You have trouble breathing or swallowing because your throat is swollen or sore  Return to the emergency department if:   · You are drooling because it hurts too much to swallow  · Your fever is higher than 102? F (39?C) or lasts longer than 3 days  · You are confused  · You taste blood in your throat  Contact your healthcare provider if:   · Your throat pain gets worse  · You have a painful lump in your throat that does not go away after 5 days  · Your symptoms do not improve after 5 days  · You have questions or concerns about your condition or care  Medicines:  Viral pharyngitis will go away on its own without treatment  Your sore throat should start to feel better in 3 to 5 days for both viral and bacterial infections  You may need any of the following:  · Antibiotics  treat a bacterial infection  · NSAIDs , such as ibuprofen, help decrease swelling, pain, and fever  NSAIDs can cause stomach bleeding or kidney problems in certain people  If you take blood thinner medicine, always ask your healthcare provider if NSAIDs are safe for you  Always read the medicine label and follow directions  · Acetaminophen  decreases pain and fever  It is available without a doctor's order  Ask how much to take and how often to take it  Follow directions  Acetaminophen can cause liver damage if not taken correctly  · Take your medicine as directed    Contact your healthcare provider if you think your medicine is not helping or if you have side effects  Tell him or her if you are allergic to any medicine  Keep a list of the medicines, vitamins, and herbs you take  Include the amounts, and when and why you take them  Bring the list or the pill bottles to follow-up visits  Carry your medicine list with you in case of an emergency  Manage your symptoms:   · Gargle salt water  Mix ¼ teaspoon salt in an 8 ounce glass of warm water and gargle  This may help decrease swelling in your throat  · Drink liquids as directed  You may need to drink more liquids than usual  Liquids may help soothe your throat and prevent dehydration  Ask how much liquid to drink each day and which liquids are best for you  · Use a cool-steam humidifier  to help moisten the air in your room and calm your cough  · Soothe your throat  with cough drops, ice, soft foods, or popsicles  Prevent the spread of pharyngitis:  Cover your mouth and nose when you cough or sneeze  Do not share food or drinks  Wash your hands often  Use soap and water  If soap and water are unavailable, use an alcohol based hand   Follow up with your healthcare provider as directed:  Write down your questions so you remember to ask them during your visits  © 2017 [employer] Information is for End User's use only and may not be sold, redistributed or otherwise used for commercial purposes  All illustrations and images included in CareNotes® are the copyrighted property of [employer]  or Jose Balderrama  The above information is an  only  It is not intended as medical advice for individual conditions or treatments  Talk to your doctor, nurse or pharmacist before following any medical regimen to see if it is safe and effective for you

## 2019-01-14 NOTE — LETTER
January 14, 2019     Patient: Jose Luis Haddad   YOB: 2000   Date of Visit: 1/14/2019       To Whom it May Concern:    Jose Luis Haddad was seen in my clinic on 1/14/2019  She may return to work on 1/16  If you have any questions or concerns, please don't hesitate to call           Sincerely,          [facility]        CC: Jose Luis Haddad

## 2019-01-14 NOTE — PROGRESS NOTES
[facility]        NAME: Burgess Elias is a 19 y o  female  : 2000    MRN: [id number]  DATE: 2019  TIME: 4:23 PM    Assessment and Plan   Acute viral pharyngitis [J02 8, B97 89]  1  Acute viral pharyngitis  POCT rapid strepA         Patient Instructions     Pharyngitis  Warm water gargles  Magic mouth as directed  Follow up with PCP in 3-5 days  Proceed to  ER if symptoms worsen  Chief Complaint     Chief Complaint   Patient presents with    Sore Throat         History of Present Illness       20 y/o female c/o sore throat and cough x 1 day  Has not tried anything over the counter for symptoms  Denies chest pain, SOB, fever, chills        Review of Systems   Review of Systems   Constitutional: Negative for activity change, appetite change, chills, diaphoresis, fatigue and fever  HENT: Positive for congestion, rhinorrhea and sore throat  Negative for ear discharge, ear pain, facial swelling, hearing loss, mouth sores, nosebleeds, postnasal drip, sinus pain, sinus pressure, sneezing and voice change  Respiratory: Positive for cough  Negative for apnea, choking, chest tightness, shortness of breath, wheezing and stridor  Cardiovascular: Negative            Current Medications       Current Outpatient Prescriptions:     fluticasone (FLONASE) 50 mcg/act nasal spray, 1 spray into each nostril daily, Disp: 16 g, Rfl: 0    medroxyPROGESTERone acetate (DEPO-PROVERA SYRINGE) 150 mg/mL injection, Inject 150 mg into a muscle, Disp: , Rfl:     cyclobenzaprine (FLEXERIL) 5 mg tablet, , Disp: , Rfl:     medroxyPROGESTERone (DEPO-PROVERA) 150 mg/mL injection, Inject 150 mg into the shoulder, thigh, or buttocks, Disp: , Rfl:     naproxen (NAPROSYN) 500 mg tablet, , Disp: , Rfl:     pseudoephedrine (SUDAFED) 60 mg tablet, Take 1 tablet (60 mg total) by mouth every 6 (six) hours as needed for congestion (Patient not taking: Reported on 2018 ), Disp: 30 tablet, Rfl: 0   SUMAtriptan (IMITREX) 25 mg tablet, Take 1 tablet (25 mg total) by mouth once as needed for migraine for up to 1 dose (Patient not taking: Reported on 1/14/2019 ), Disp: 9 tablet, Rfl: 0    Current Allergies     Allergies as of 01/14/2019 - Reviewed 01/14/2019   Allergen Reaction Noted    Amoxicillin Hives 08/17/2016            The following portions of the patient's history were reviewed and updated as appropriate: allergies, current medications, past family history, past medical history, past social history, past surgical history and problem list      History reviewed  No pertinent past medical history  History reviewed  No pertinent surgical history  Family History   Problem Relation Age of Onset    No Known Problems Mother     No Known Problems Father          Medications have been verified  Objective   BP 98/64   Pulse 69   Temp 98 5 °F (36 9 °C) (Temporal)   Resp 18   Ht 5' 5 5" (1 664 m)   Wt 70 kg (154 lb 6 4 oz)   SpO2 95%   BMI 25 30 kg/m²        Physical Exam     Physical Exam   Constitutional: She appears well-developed and well-nourished  No distress  HENT:   Head: Normocephalic and atraumatic  Right Ear: Hearing, tympanic membrane, external ear and ear canal normal    Left Ear: Hearing, tympanic membrane, external ear and ear canal normal    Nose: Rhinorrhea present  Mouth/Throat: Uvula is midline, oropharynx is clear and moist and mucous membranes are normal    Neck: Normal range of motion  Neck supple  Cardiovascular: Normal rate, regular rhythm, normal heart sounds and intact distal pulses  Pulmonary/Chest: Effort normal and breath sounds normal    Lymphadenopathy:     She has cervical adenopathy  Skin: She is not diaphoretic

## 2019-06-04 ENCOUNTER — HOSPITAL ENCOUNTER (EMERGENCY)
Facility: HOSPITAL | Age: 19
Discharge: HOME | End: 2019-06-05
Attending: EMERGENCY MEDICINE | Admitting: EMERGENCY MEDICINE

## 2019-06-04 ENCOUNTER — APPOINTMENT (EMERGENCY)
Dept: CT IMAGING | Facility: HOSPITAL | Age: 19
End: 2019-06-04

## 2019-06-04 VITALS
DIASTOLIC BLOOD PRESSURE: 68 MMHG | WEIGHT: 170.19 LBS | HEART RATE: 62 BPM | SYSTOLIC BLOOD PRESSURE: 136 MMHG | TEMPERATURE: 98 F | RESPIRATION RATE: 18 BRPM | OXYGEN SATURATION: 98 % | BODY MASS INDEX: 27.89 KG/M2

## 2019-06-04 DIAGNOSIS — R10.9 ABDOMINAL PAIN: ICD-10-CM

## 2019-06-04 DIAGNOSIS — K52.9 GASTROENTERITIS: Primary | ICD-10-CM

## 2019-06-04 LAB
ALBUMIN SERPL BCP-MCNC: 3.9 G/DL (ref 3.5–5)
ALP SERPL-CCNC: 68 U/L (ref 46–116)
ALT SERPL W P-5'-P-CCNC: 26 U/L (ref 12–78)
ANION GAP SERPL CALCULATED.3IONS-SCNC: 5 MMOL/L (ref 4–13)
APTT PPP: 34 SECONDS (ref 26–38)
AST SERPL W P-5'-P-CCNC: 14 U/L (ref 5–45)
B-HCG SERPL-ACNC: <2 MIU/ML
BASOPHILS # BLD AUTO: 0.03 THOUSANDS/ΜL (ref 0–0.1)
BASOPHILS NFR BLD AUTO: 0 % (ref 0–1)
BILIRUB SERPL-MCNC: 0.2 MG/DL (ref 0.2–1)
BILIRUB UR QL STRIP: NEGATIVE
BUN SERPL-MCNC: 13 MG/DL (ref 5–25)
CALCIUM SERPL-MCNC: 9.2 MG/DL (ref 8.3–10.1)
CHLORIDE SERPL-SCNC: 105 MMOL/L (ref 100–108)
CLARITY UR: CLEAR
CO2 SERPL-SCNC: 27 MMOL/L (ref 21–32)
COLOR UR: YELLOW
CREAT SERPL-MCNC: 0.81 MG/DL (ref 0.6–1.3)
EOSINOPHIL # BLD AUTO: 0.07 THOUSAND/ΜL (ref 0–0.61)
EOSINOPHIL NFR BLD AUTO: 1 % (ref 0–6)
ERYTHROCYTE [DISTWIDTH] IN BLOOD BY AUTOMATED COUNT: 11.9 % (ref 11.6–15.1)
EXT PREG TEST URINE: NEGATIVE
GFR SERPL CREATININE-BSD FRML MDRD: 104 ML/MIN/1.73SQ M
GLUCOSE SERPL-MCNC: 93 MG/DL (ref 65–140)
GLUCOSE UR STRIP-MCNC: NEGATIVE MG/DL
HCT VFR BLD AUTO: 41.2 % (ref 34.8–46.1)
HGB BLD-MCNC: 14.2 G/DL (ref 11.5–15.4)
HGB UR QL STRIP.AUTO: NEGATIVE
IMM GRANULOCYTES # BLD AUTO: 0.02 THOUSAND/UL (ref 0–0.2)
IMM GRANULOCYTES NFR BLD AUTO: 0 % (ref 0–2)
INR PPP: 1.06 (ref 0.86–1.17)
KETONES UR STRIP-MCNC: NEGATIVE MG/DL
LACTATE SERPL-SCNC: 0.6 MMOL/L (ref 0.5–2)
LEUKOCYTE ESTERASE UR QL STRIP: NEGATIVE
LIPASE SERPL-CCNC: 154 U/L (ref 73–393)
LYMPHOCYTES # BLD AUTO: 2.34 THOUSANDS/ΜL (ref 0.6–4.47)
LYMPHOCYTES NFR BLD AUTO: 27 % (ref 14–44)
MCH RBC QN AUTO: 29.5 PG (ref 26.8–34.3)
MCHC RBC AUTO-ENTMCNC: 34.5 G/DL (ref 31.4–37.4)
MCV RBC AUTO: 86 FL (ref 82–98)
MONOCYTES # BLD AUTO: 1.03 THOUSAND/ΜL (ref 0.17–1.22)
MONOCYTES NFR BLD AUTO: 12 % (ref 4–12)
NEUTROPHILS # BLD AUTO: 5.24 THOUSANDS/ΜL (ref 1.85–7.62)
NEUTS SEG NFR BLD AUTO: 60 % (ref 43–75)
NITRITE UR QL STRIP: NEGATIVE
NRBC BLD AUTO-RTO: 0 /100 WBCS
PH UR STRIP.AUTO: 7 [PH] (ref 4.5–8)
PLATELET # BLD AUTO: 277 THOUSANDS/UL (ref 149–390)
PMV BLD AUTO: 9.4 FL (ref 8.9–12.7)
POTASSIUM SERPL-SCNC: 3.9 MMOL/L (ref 3.5–5.3)
PROT SERPL-MCNC: 7.7 G/DL (ref 6.4–8.2)
PROT UR STRIP-MCNC: NEGATIVE MG/DL
PROTHROMBIN TIME: 13.5 SECONDS (ref 11.8–14.2)
RBC # BLD AUTO: 4.81 MILLION/UL (ref 3.81–5.12)
SODIUM SERPL-SCNC: 137 MMOL/L (ref 136–145)
SP GR UR STRIP.AUTO: 1.02 (ref 1–1.03)
UROBILINOGEN UR QL STRIP.AUTO: 0.2 E.U./DL
WBC # BLD AUTO: 8.73 THOUSAND/UL (ref 4.31–10.16)

## 2019-06-04 PROCEDURE — 85025 COMPLETE CBC W/AUTO DIFF WBC: CPT | Performed by: EMERGENCY MEDICINE

## 2019-06-04 PROCEDURE — 85730 THROMBOPLASTIN TIME PARTIAL: CPT | Performed by: EMERGENCY MEDICINE

## 2019-06-04 PROCEDURE — 96375 TX/PRO/DX INJ NEW DRUG ADDON: CPT

## 2019-06-04 PROCEDURE — 80053 COMPREHEN METABOLIC PANEL: CPT | Performed by: EMERGENCY MEDICINE

## 2019-06-04 PROCEDURE — 84702 CHORIONIC GONADOTROPIN TEST: CPT | Performed by: EMERGENCY MEDICINE

## 2019-06-04 PROCEDURE — 36415 COLL VENOUS BLD VENIPUNCTURE: CPT | Performed by: EMERGENCY MEDICINE

## 2019-06-04 PROCEDURE — 81025 URINE PREGNANCY TEST: CPT | Performed by: EMERGENCY MEDICINE

## 2019-06-04 PROCEDURE — 96361 HYDRATE IV INFUSION ADD-ON: CPT

## 2019-06-04 PROCEDURE — 85610 PROTHROMBIN TIME: CPT | Performed by: EMERGENCY MEDICINE

## 2019-06-04 PROCEDURE — 99284 EMERGENCY DEPT VISIT MOD MDM: CPT | Performed by: EMERGENCY MEDICINE

## 2019-06-04 PROCEDURE — 87040 BLOOD CULTURE FOR BACTERIA: CPT | Performed by: EMERGENCY MEDICINE

## 2019-06-04 PROCEDURE — 83690 ASSAY OF LIPASE: CPT | Performed by: EMERGENCY MEDICINE

## 2019-06-04 PROCEDURE — 87491 CHLMYD TRACH DNA AMP PROBE: CPT | Performed by: EMERGENCY MEDICINE

## 2019-06-04 PROCEDURE — 81003 URINALYSIS AUTO W/O SCOPE: CPT

## 2019-06-04 PROCEDURE — 83605 ASSAY OF LACTIC ACID: CPT | Performed by: EMERGENCY MEDICINE

## 2019-06-04 PROCEDURE — 99284 EMERGENCY DEPT VISIT MOD MDM: CPT

## 2019-06-04 PROCEDURE — 96374 THER/PROPH/DIAG INJ IV PUSH: CPT

## 2019-06-04 PROCEDURE — 87591 N.GONORRHOEAE DNA AMP PROB: CPT | Performed by: EMERGENCY MEDICINE

## 2019-06-04 RX ORDER — KETOROLAC TROMETHAMINE 30 MG/ML
30 INJECTION, SOLUTION INTRAMUSCULAR; INTRAVENOUS ONCE
Status: COMPLETED | OUTPATIENT
Start: 2019-06-04 | End: 2019-06-04

## 2019-06-04 RX ORDER — SODIUM CHLORIDE 9 MG/ML
125 INJECTION, SOLUTION INTRAVENOUS CONTINUOUS
Status: DISCONTINUED | OUTPATIENT
Start: 2019-06-04 | End: 2019-06-05

## 2019-06-04 RX ORDER — ONDANSETRON 2 MG/ML
4 INJECTION INTRAMUSCULAR; INTRAVENOUS ONCE
Status: COMPLETED | OUTPATIENT
Start: 2019-06-04 | End: 2019-06-04

## 2019-06-04 RX ADMIN — SODIUM CHLORIDE 1000 ML: 0.9 INJECTION, SOLUTION INTRAVENOUS at 22:58

## 2019-06-04 RX ADMIN — ONDANSETRON 4 MG: 2 INJECTION INTRAMUSCULAR; INTRAVENOUS at 22:58

## 2019-06-04 RX ADMIN — FAMOTIDINE 20 MG: 10 INJECTION, SOLUTION INTRAVENOUS at 22:58

## 2019-06-04 RX ADMIN — KETOROLAC TROMETHAMINE 30 MG: 30 INJECTION, SOLUTION INTRAMUSCULAR at 23:38

## 2019-06-05 RX ORDER — METOCLOPRAMIDE 10 MG/1
10 TABLET ORAL 4 TIMES DAILY
Qty: 28 TABLET | Refills: 0 | Status: SHIPPED | OUTPATIENT
Start: 2019-06-05 | End: 2019-06-12

## 2019-06-05 RX ORDER — DICYCLOMINE HCL 20 MG
20 TABLET ORAL EVERY 6 HOURS
Qty: 20 TABLET | Refills: 0 | Status: SHIPPED | OUTPATIENT
Start: 2019-06-05 | End: 2020-04-07

## 2019-06-07 LAB
C TRACH DNA SPEC QL NAA+PROBE: NEGATIVE
N GONORRHOEA DNA SPEC QL NAA+PROBE: NEGATIVE

## 2019-06-10 LAB
BACTERIA BLD CULT: NORMAL
BACTERIA BLD CULT: NORMAL

## 2019-06-23 ENCOUNTER — HOSPITAL ENCOUNTER (EMERGENCY)
Facility: HOSPITAL | Age: 19
Discharge: HOME | End: 2019-06-23
Attending: EMERGENCY MEDICINE | Admitting: EMERGENCY MEDICINE

## 2019-06-23 VITALS
DIASTOLIC BLOOD PRESSURE: 65 MMHG | BODY MASS INDEX: 26.88 KG/M2 | RESPIRATION RATE: 18 BRPM | OXYGEN SATURATION: 98 % | HEART RATE: 101 BPM | TEMPERATURE: 98.4 F | SYSTOLIC BLOOD PRESSURE: 112 MMHG | WEIGHT: 164 LBS

## 2019-06-23 DIAGNOSIS — L03.116 CELLULITIS OF LEFT LOWER EXTREMITY: ICD-10-CM

## 2019-06-23 DIAGNOSIS — L25.9 CONTACT DERMATITIS: Primary | ICD-10-CM

## 2019-06-23 PROCEDURE — 99284 EMERGENCY DEPT VISIT MOD MDM: CPT | Performed by: PHYSICIAN ASSISTANT

## 2019-06-23 PROCEDURE — 99282 EMERGENCY DEPT VISIT SF MDM: CPT

## 2019-06-23 RX ORDER — PREDNISONE 20 MG/1
20 TABLET ORAL ONCE
Status: COMPLETED | OUTPATIENT
Start: 2019-06-23 | End: 2019-06-23

## 2019-06-23 RX ORDER — CLINDAMYCIN HYDROCHLORIDE 300 MG/1
300 CAPSULE ORAL 4 TIMES DAILY
Qty: 20 CAPSULE | Refills: 0 | Status: SHIPPED | OUTPATIENT
Start: 2019-06-23 | End: 2019-06-28

## 2019-06-23 RX ORDER — METHYLPREDNISOLONE 4 MG/1
TABLET ORAL
Qty: 21 TABLET | Refills: 0 | Status: SHIPPED | OUTPATIENT
Start: 2019-06-23 | End: 2020-04-07

## 2019-06-23 RX ORDER — DIPHENHYDRAMINE HCL 25 MG
25 TABLET ORAL EVERY 6 HOURS
Qty: 20 TABLET | Refills: 0 | Status: SHIPPED | OUTPATIENT
Start: 2019-06-23

## 2019-06-23 RX ADMIN — PREDNISONE 20 MG: 20 TABLET ORAL at 16:06

## 2019-06-23 NOTE — ED PROVIDER NOTES
History  Chief Complaint   Patient presents with   [facility]     complains of "poison ivy" both lower exts X one week , given steroids from [facility] without relief     Elizabeth Hobbs is a 21 yo F presenting with rash to bilateral lower legs that began approx 6 days ago  States she was exposed to poison ivy at that time and developed extremely itchy rash to bilateral lower legs, as well as occasionally over forearms and abdomen  Denies drainage or discharge  No fevers/chills  Does state the rash to L leg has had increased redness and describes mild pain over same region  No fevers/chills  No streaking away from rash  Given topical steroids at urgent care without relief  History provided by:  Patient   used: No    Poison Ivy   Location:  Bilateral legs  Quality:  Pruritic  Onset quality:  Gradual  Duration:  6 days  Timing:  Constant  Progression:  Worsening  Chronicity:  New  Context:  Poison ivy exposure  Associated symptoms: rash    Associated symptoms: no abdominal pain, no chest pain, no congestion, no cough, no diarrhea, no ear pain, no fever, no headaches, no myalgias, no nausea, no rhinorrhea, no shortness of breath, no sore throat, no vomiting and no wheezing        Prior to Admission Medications   Prescriptions Last Dose Informant Patient Reported? Taking?    SUMAtriptan (IMITREX) 25 mg tablet  Self No No   Sig: Take 1 tablet (25 mg total) by mouth once as needed for migraine for up to 1 dose   Patient not taking: Reported on 2019    cyclobenzaprine (FLEXERIL) 5 mg tablet  Self Yes No   dicyclomine (BENTYL) 20 mg tablet   No No   Sig: Take 1 tablet (20 mg total) by mouth every 6 (six) hours for 5 days For crampy abdominal pain   fluticasone (FLONASE) 50 mcg/act nasal spray  Self No No   Si spray into each nostril daily   medroxyPROGESTERone (DEPO-PROVERA) 150 mg/mL injection  Self Yes No   Sig: Inject 150 mg into the shoulder, thigh, or buttocks   medroxyPROGESTERone acetate (DEPO-PROVERA SYRINGE) 150 mg/mL injection  Self Yes No   Sig: Inject 150 mg into a muscle   naproxen (NAPROSYN) 500 mg tablet  Self Yes No   pseudoephedrine (SUDAFED) 60 mg tablet  Self No No   Sig: Take 1 tablet (60 mg total) by mouth every 6 (six) hours as needed for congestion   Patient not taking: Reported on 12/5/2018       Facility-Administered Medications: None       History reviewed  No pertinent past medical history  History reviewed  No pertinent surgical history  Family History   Problem Relation Age of Onset    No Known Problems Mother     No Known Problems Father      I have reviewed and agree with the history as documented  Social History     Tobacco Use    Smoking status: Never Smoker    Smokeless tobacco: Never Used   Substance Use Topics    Alcohol use: No    Drug use: No        Review of Systems   Constitutional: Negative for chills and fever  HENT: Negative for congestion, ear discharge, ear pain, mouth sores, rhinorrhea, sinus pressure, sinus pain, sore throat and voice change  Eyes: Negative for pain, redness and visual disturbance  Respiratory: Negative for cough, chest tightness, shortness of breath and wheezing  Cardiovascular: Negative for chest pain and palpitations  Gastrointestinal: Negative for abdominal pain, constipation, diarrhea, nausea and vomiting  Genitourinary: Negative for dysuria, frequency and urgency  Musculoskeletal: Negative for myalgias, neck pain and neck stiffness  Skin: Positive for color change and rash  Negative for pallor and wound  Neurological: Negative for dizziness, weakness, light-headedness, numbness and headaches  Physical Exam  Physical Exam   Constitutional: She is oriented to person, place, and time  She appears well-developed and well-nourished  No distress  HENT:   Head: Normocephalic and atraumatic     Right Ear: Tympanic membrane, external ear and ear canal normal    Left Ear: Tympanic membrane, external ear and ear canal normal    Nose: Nose normal    Mouth/Throat: Oropharynx is clear and moist  No oropharyngeal exudate  Eyes: Pupils are equal, round, and reactive to light  Conjunctivae and EOM are normal  Right eye exhibits no discharge  Left eye exhibits no discharge  Neck: Normal range of motion  Neck supple  Cardiovascular: Normal rate, regular rhythm and normal heart sounds  Exam reveals no gallop and no friction rub  No murmur heard  Pulmonary/Chest: Effort normal and breath sounds normal  No stridor  No respiratory distress  She has no wheezes  She has no rales  Abdominal: Soft  Bowel sounds are normal  She exhibits no distension  There is no tenderness  There is no guarding  Lymphadenopathy:     She has no cervical adenopathy  Neurological: She is alert and oriented to person, place, and time  She exhibits normal muscle tone  Coordination normal    Skin: Skin is warm and dry  Capillary refill takes less than 2 seconds  Rash noted  She is not diaphoretic  There is erythema  No pallor  Diffuse, maculopapular, vesicular rash in linear distributions bilateral thighs, intermittently over bilateral forearms and anterior abdomen    3x4 cm poorly demarcated region of erythema, warmth, and tenderness L medial thigh; no surrounding streaking   Psychiatric: She has a normal mood and affect  Her behavior is normal  Judgment and thought content normal    Nursing note and vitals reviewed        Vital Signs  ED Triage Vitals   Temperature Pulse Respirations Blood Pressure SpO2   06/23/19 1448 06/23/19 1448 06/23/19 1448 06/23/19 1448 06/23/19 1452   98 4 °F (36 9 °C) 101 18 112/65 98 %      Temp Source Heart Rate Source Patient Position - Orthostatic VS BP Location FiO2 (%)   06/23/19 1448 -- -- -- --   Oral          Pain Score       06/23/19 1448       7           Vitals:    06/23/19 1448   BP: 112/65   Pulse: 101         Visual Acuity      ED Medications  Medications   predniSONE tablet 20 mg (20 mg Oral Given 6/23/19 1606)       Diagnostic Studies  Results Reviewed     None                 No orders to display              Procedures  Procedures       ED Course                               MDM  Number of Diagnoses or Management Options  Cellulitis of left lower extremity:   Contact dermatitis:   Diagnosis management comments: Pt with known exposure to poison ivy, diffuse pruritic rash c/w contact dermatitis  No relief w/ topical steroids  Will rx oral steroid taper, benadryl PRN  Early cellulitis over L medial thigh measuring approx 3x4 cm  No fevers/chills  No drainage/discharge  No evidence of abscess  Will treat with clindamycin due to PCN allergy  Pt understands strict return indications  Patient Progress  Patient progress: stable      Disposition  Final diagnoses:   Contact dermatitis   Cellulitis of left lower extremity     Time reflects when diagnosis was documented in both MDM as applicable and the Disposition within this note     Time User Action Codes Description Comment    6/23/2019  4:08 PM Leanna Davis [[id number]] Contact dermatitis     6/23/2019  4:20 PM Leanna Davis [[id number]] Cellulitis of left lower extremity       ED Disposition     ED Disposition Condition Date/Time Comment    Discharge Stable Sun Jun 23, 2019  4:07 PM Xenia Haji discharge to home/self care              Follow-up Information    None         Discharge Medication List as of 6/23/2019  4:13 PM      START taking these medications    Details   diphenhydrAMINE (BENADRYL) 25 mg tablet Take 1 tablet (25 mg total) by mouth every 6 (six) hours, Starting Sun 6/23/2019, Print      methylPREDNISolone 4 MG tablet therapy pack Use as directed on package, Print         CONTINUE these medications which have NOT CHANGED    Details   cyclobenzaprine (FLEXERIL) 5 mg tablet Starting Wed 8/22/2018, Historical Med      dicyclomine (BENTYL) 20 mg tablet Take 1 tablet (20 mg total) by mouth every 6 (six) hours for 5 days For crampy abdominal pain, Starting Wed 6/5/2019, Until Mon 6/10/2019, Print      fluticasone (FLONASE) 50 mcg/act nasal spray 1 spray into each nostril daily, Starting Wed 11/7/2018, Normal      medroxyPROGESTERone (DEPO-PROVERA) 150 mg/mL injection Inject 150 mg into the shoulder, thigh, or buttocks, Starting Wed 12/13/2017, Until Sat 12/8/2018, Historical Med      medroxyPROGESTERone acetate (DEPO-PROVERA SYRINGE) 150 mg/mL injection Inject 150 mg into a muscle, Starting Wed 12/26/2018, Until Tue 3/26/2019, Historical Med      naproxen (NAPROSYN) 500 mg tablet Starting Tue 9/4/2018, Historical Med      pseudoephedrine (SUDAFED) 60 mg tablet Take 1 tablet (60 mg total) by mouth every 6 (six) hours as needed for congestion, Starting Wed 11/7/2018, Normal      SUMAtriptan (IMITREX) 25 mg tablet Take 1 tablet (25 mg total) by mouth once as needed for migraine for up to 1 dose, Starting u 12/20/2018, Normal           No discharge procedures on file      ED Provider  Electronically Signed by           Shirin Fleming PA-C  06/23/19 1802

## 2019-06-23 NOTE — DISCHARGE INSTRUCTIONS
Please refer to the attached information for strict return instructions  If symptoms worsen or new symptoms develop please return to the Er  Take steroid as prescribed  Benadryl as needed

## 2019-10-22 ENCOUNTER — OFFICE VISIT (OUTPATIENT)
Dept: URGENT CARE | Facility: MEDICAL CENTER | Age: 19
End: 2019-10-22

## 2019-10-22 VITALS
WEIGHT: 174 LBS | TEMPERATURE: 97 F | BODY MASS INDEX: 28.99 KG/M2 | RESPIRATION RATE: 16 BRPM | HEIGHT: 65 IN | SYSTOLIC BLOOD PRESSURE: 113 MMHG | HEART RATE: 70 BPM | OXYGEN SATURATION: 98 % | DIASTOLIC BLOOD PRESSURE: 74 MMHG

## 2019-10-22 DIAGNOSIS — R11.0 NAUSEA: Primary | ICD-10-CM

## 2019-10-22 PROCEDURE — G0382 LEV 3 HOSP TYPE B ED VISIT: HCPCS | Performed by: PHYSICIAN ASSISTANT

## 2019-10-22 RX ORDER — ONDANSETRON 4 MG/1
4 TABLET, ORALLY DISINTEGRATING ORAL EVERY 6 HOURS PRN
Qty: 20 TABLET | Refills: 0 | Status: SHIPPED | OUTPATIENT
Start: 2019-10-22 | End: 2020-04-07

## 2019-10-22 NOTE — PROGRESS NOTES
[facility]        NAME: Georgie Shelton is a 22 y o  female  : 2000    MRN: [id number]  DATE: 2019  TIME: 5:59 PM    Assessment and Plan   Nausea [R11 0]  1  Nausea         Normal physical exam  Urine dip reveals no abnormalities and pregnancy test is negative  Zofran ODT given  Recommended follow-up with PCP if symptoms persist    Patient Instructions     Please take Zofran for nausea   follow-up with PCP if symptoms persist   report to ER if symptoms worsen    Chief Complaint     Chief Complaint   Patient presents with    Nausea     pt with nausea since last Thursday, no vomiting, denies fevers, slight abdominal pain         History of Present Illness         Patient is a 12-year-old female who presents today with complaints of nausea for the past 5 days  She denies any fevers, abdominal pain, vomiting, diarrhea  She denies constipation  Last menstrual period was about her proximally 2 weeks ago  She denies any vaginal bleeding or urinary symptoms  No dysuria, urgency, frequency  She denies headaches or dizziness  Review of Systems   Review of Systems   Constitutional: Negative for fever  Respiratory: Negative for shortness of breath  Cardiovascular: Negative for chest pain  Gastrointestinal: Positive for nausea  Negative for abdominal pain, constipation, diarrhea and vomiting  Genitourinary: Negative for difficulty urinating, dysuria, frequency, urgency and vaginal bleeding  Neurological: Negative for dizziness, light-headedness and headaches           Current Medications       Current Outpatient Medications:     cyclobenzaprine (FLEXERIL) 5 mg tablet, , Disp: , Rfl:     dicyclomine (BENTYL) 20 mg tablet, Take 1 tablet (20 mg total) by mouth every 6 (six) hours for 5 days For crampy abdominal pain, Disp: 20 tablet, Rfl: 0    diphenhydrAMINE (BENADRYL) 25 mg tablet, Take 1 tablet (25 mg total) by mouth every 6 (six) hours, Disp: 20 tablet, Rfl: 0   fluticasone (FLONASE) 50 mcg/act nasal spray, 1 spray into each nostril daily, Disp: 16 g, Rfl: 0    medroxyPROGESTERone (DEPO-PROVERA) 150 mg/mL injection, Inject 150 mg into the shoulder, thigh, or buttocks, Disp: , Rfl:     medroxyPROGESTERone acetate (DEPO-PROVERA SYRINGE) 150 mg/mL injection, Inject 150 mg into a muscle, Disp: , Rfl:     methylPREDNISolone 4 MG tablet therapy pack, Use as directed on package, Disp: 21 tablet, Rfl: 0    naproxen (NAPROSYN) 500 mg tablet, , Disp: , Rfl:     pseudoephedrine (SUDAFED) 60 mg tablet, Take 1 tablet (60 mg total) by mouth every 6 (six) hours as needed for congestion (Patient not taking: Reported on 12/5/2018 ), Disp: 30 tablet, Rfl: 0    SUMAtriptan (IMITREX) 25 mg tablet, Take 1 tablet (25 mg total) by mouth once as needed for migraine for up to 1 dose (Patient not taking: Reported on 1/14/2019 ), Disp: 9 tablet, Rfl: 0    Current Allergies     Allergies as of 10/22/2019 - Reviewed 10/22/2019   Allergen Reaction Noted    Amoxicillin Hives 08/17/2016            The following portions of the patient's history were reviewed and updated as appropriate: allergies, current medications, past family history, past medical history, past social history, past surgical history and problem list      History reviewed  No pertinent past medical history  History reviewed  No pertinent surgical history  Family History   Problem Relation Age of Onset    No Known Problems Mother     No Known Problems Father          Medications have been verified  Objective   /74   Pulse 70   Temp (!) 97 °F (36 1 °C)   Resp 16   Ht 5' 5" (1 651 m)   Wt 78 9 kg (174 lb)   SpO2 98%   BMI 28 96 kg/m²        Physical Exam     Physical Exam   Constitutional: She is oriented to person, place, and time  She appears well-developed and well-nourished  HENT:   Head: Normocephalic and atraumatic     Mouth/Throat: Oropharynx is clear and moist    Eyes: Pupils are equal, round, and reactive to light  Conjunctivae and EOM are normal    Neck: Neck supple  Cardiovascular: Normal rate and regular rhythm  Pulmonary/Chest: Effort normal and breath sounds normal    Abdominal: Soft  Bowel sounds are normal  She exhibits no distension and no mass  There is no tenderness  No hernia  Lymphadenopathy:     She has no cervical adenopathy  Neurological: She is alert and oriented to person, place, and time  Skin: Skin is warm and dry

## 2019-10-22 NOTE — PATIENT INSTRUCTIONS
Please take Zofran for nausea   follow-up with PCP if symptoms persist   report to ER if symptoms worsen    Acute Nausea and Vomiting   WHAT YOU NEED TO KNOW:   Acute nausea and vomiting start suddenly, worsen quickly, and last a short time  DISCHARGE INSTRUCTIONS:   Return to the emergency department if:   · You see blood in your vomit or your bowel movements  · You have sudden, severe pain in your chest and upper abdomen after hard vomiting or retching  · You have swelling in your neck and chest      · You are dizzy, cold, and thirsty and your eyes and mouth are dry  · You are urinating very little or not at all  · You have muscle weakness, leg cramps, and trouble breathing  · Your heart is beating much faster than normal      · You continue to vomit for more than 48 hours  Contact your healthcare provider if:   · You have frequent dry heaves (vomiting but nothing comes out)  · Your nausea and vomiting does not get better or go away after you use medicine  · You have questions or concerns about your condition or treatment  Medicines: You may need any of the following:  · Medicines  may be given to calm your stomach and stop your vomiting  You may also need medicines to help you feel more relaxed or to stop nausea and vomiting caused by motion sickness  · Gastrointestinal stimulants  are used to help empty your stomach and bowels  This may help decrease nausea and vomiting  · Take your medicine as directed  Contact your healthcare provider if you think your medicine is not helping or if you have side effects  Tell him or her if you are allergic to any medicine  Keep a list of the medicines, vitamins, and herbs you take  Include the amounts, and when and why you take them  Bring the list or the pill bottles to follow-up visits  Carry your medicine list with you in case of an emergency  Prevent or manage acute nausea and vomiting:   · Do not drink alcohol    Alcohol may upset or irritate your stomach  Too much alcohol can also cause acute nausea and vomiting  · Control stress  Headaches due to stress may cause nausea and vomiting  Find ways to relax and manage your stress  Get more rest and sleep  · Drink more liquids as directed  Vomiting can lead to dehydration  It is important to drink more liquids to help replace lost body fluids  Ask your healthcare provider how much liquid to drink each day and which liquids are best for you  Your provider may recommend that you drink an oral rehydration solution (ORS)  ORS contains water, salts, and sugar that are needed to replace the lost body fluids  Ask what kind of ORS to use, how much to drink, and where to get it  · Eat smaller meals, more often  Eat small amounts of food every 2 to 3 hours, even if you are not hungry  Food in your stomach may decrease your nausea  · Talk to your healthcare provider before you take over-the-counter (OTC) medicines  These medicines can cause serious problems if you use certain other medicines, or you have a medical condition  You may have problems if you use too much or use them for longer than the label says  Follow directions on the label carefully  Follow up with your healthcare provider as directed:  Write down your questions so you remember to ask them during your follow-up visits  © 2017 [employer]  Information is for End User's use only and may not be sold, redistributed or otherwise used for commercial purposes  All illustrations and images included in CareNotes® are the copyrighted property of [employer]  or [employer]  The above information is an  only  It is not intended as medical advice for individual conditions or treatments  Talk to your doctor, nurse or pharmacist before following any medical regimen to see if it is safe and effective for you

## 2020-02-09 ENCOUNTER — OFFICE VISIT (OUTPATIENT)
Dept: URGENT CARE | Facility: MEDICAL CENTER | Age: 20
End: 2020-02-09
Payer: COMMERCIAL

## 2020-02-09 VITALS
BODY MASS INDEX: 28.79 KG/M2 | HEART RATE: 82 BPM | SYSTOLIC BLOOD PRESSURE: 106 MMHG | TEMPERATURE: 97.7 F | HEIGHT: 65 IN | OXYGEN SATURATION: 100 % | WEIGHT: 172.8 LBS | RESPIRATION RATE: 18 BRPM | DIASTOLIC BLOOD PRESSURE: 68 MMHG

## 2020-02-09 DIAGNOSIS — R30.0 DYSURIA: Primary | ICD-10-CM

## 2020-02-09 LAB
SL AMB  POCT GLUCOSE, UA: ABNORMAL
SL AMB LEUKOCYTE ESTERASE,UA: ABNORMAL
SL AMB POCT BILIRUBIN,UA: ABNORMAL
SL AMB POCT BLOOD,UA: ABNORMAL
SL AMB POCT CLARITY,UA: ABNORMAL
SL AMB POCT COLOR,UA: ABNORMAL
SL AMB POCT KETONES,UA: ABNORMAL
SL AMB POCT NITRITE,UA: ABNORMAL
SL AMB POCT PH,UA: 5
SL AMB POCT SPECIFIC GRAVITY,UA: 1.01
SL AMB POCT URINE PROTEIN: 300
SL AMB POCT UROBILINOGEN: 0.2

## 2020-02-09 PROCEDURE — G0382 LEV 3 HOSP TYPE B ED VISIT: HCPCS | Performed by: PHYSICIAN ASSISTANT

## 2020-02-09 PROCEDURE — 99213 OFFICE O/P EST LOW 20 MIN: CPT | Performed by: PHYSICIAN ASSISTANT

## 2020-02-09 PROCEDURE — 87186 SC STD MICRODIL/AGAR DIL: CPT | Performed by: PHYSICIAN ASSISTANT

## 2020-02-09 PROCEDURE — 87077 CULTURE AEROBIC IDENTIFY: CPT | Performed by: PHYSICIAN ASSISTANT

## 2020-02-09 PROCEDURE — 99283 EMERGENCY DEPT VISIT LOW MDM: CPT | Performed by: PHYSICIAN ASSISTANT

## 2020-02-09 PROCEDURE — 87086 URINE CULTURE/COLONY COUNT: CPT | Performed by: PHYSICIAN ASSISTANT

## 2020-02-09 PROCEDURE — 81002 URINALYSIS NONAUTO W/O SCOPE: CPT | Performed by: PHYSICIAN ASSISTANT

## 2020-02-09 RX ORDER — NITROFURANTOIN 25; 75 MG/1; MG/1
100 CAPSULE ORAL 2 TIMES DAILY
Qty: 14 CAPSULE | Refills: 0 | Status: SHIPPED | OUTPATIENT
Start: 2020-02-09 | End: 2020-02-16

## 2020-02-09 NOTE — PATIENT INSTRUCTIONS
Dysuria  macrobid twice daily x 7 days  Follow up with PCP in 3-5 days  Proceed to  ER if symptoms worsen  Dysuria   WHAT YOU NEED TO KNOW:   Dysuria is difficulty urinating, or pain, burning, or discomfort with urination  Dysuria is usually a symptom of another problem  DISCHARGE INSTRUCTIONS:   Return to the emergency department if:   · You have severe back, side, or abdominal pain  · You have fever and shaking chills  · You vomit several times in a row  Contact your healthcare provider if:   · Your symptoms do not go away, even after treatment  · You have questions or concerns about your condition or care  Medicines:   · Medicines  may be given to help treat a bacterial infection or help decrease bladder spasms  · Take your medicine as directed  Contact your healthcare provider if you think your medicine is not helping or if you have side effects  Tell him of her if you are allergic to any medicine  Keep a list of the medicines, vitamins, and herbs you take  Include the amounts, and when and why you take them  Bring the list or the pill bottles to follow-up visits  Carry your medicine list with you in case of an emergency  Follow up with your healthcare provider as directed: Your healthcare provider may also refer you to a urologist or nephrologist to have additional testing  Write down your questions so you remember to ask them during your visits  Manage your dysuria:   · Drink more liquids  Liquids help flush out bacteria that may be causing an infection  Ask your healthcare provider how much liquid to drink each day and which liquids are best for you  · Take sitz baths as directed  Fill a bathtub with 4 to 6 inches of warm water  You may also use a sitz bath pan that fits over a toilet  Sit in the sitz bath for 20 minutes  Do this 2 to 3 times a day, or as directed  The warm water can help decrease pain and swelling     © 2017 [employer] Information is for End User's use only and may not be sold, redistributed or otherwise used for commercial purposes  All illustrations and images included in CareNotes® are the copyrighted property of [employer]  or [employer]  The above information is an  only  It is not intended as medical advice for individual conditions or treatments  Talk to your doctor, nurse or pharmacist before following any medical regimen to see if it is safe and effective for you

## 2020-02-09 NOTE — PROGRESS NOTES
[facility]        NAME: Bebeto Landrum is a 22 y o  female  : 2000    MRN: [id number]  DATE: 2020  TIME: 12:26 PM    Assessment and Plan   Dysuria [R30 0]  1  Dysuria  POCT urine dip    Urine culture         Patient Instructions     Dysuria  macrobid twice daily x 7 days  Follow up with PCP in 3-5 days  Proceed to  ER if symptoms worsen  Chief Complaint     Chief Complaint   Patient presents with    Possible UTI     Pt  with right flank pain since yesterday  History of Present Illness       23 y/o female presents c/o frequency, urgency and dysuria x 2 days  Denies fever, chills, nausea, vomiting      Review of Systems   Review of Systems   Constitutional: Negative  HENT: Negative  Eyes: Negative  Respiratory: Negative  Negative for cough, chest tightness, shortness of breath, wheezing and stridor  Cardiovascular: Negative  Negative for chest pain, palpitations and leg swelling  Gastrointestinal: Negative  Genitourinary: Positive for dysuria, frequency and urgency  Negative for decreased urine volume, difficulty urinating, dyspareunia, enuresis, flank pain, genital sores, hematuria, menstrual problem, pelvic pain, vaginal bleeding, vaginal discharge and vaginal pain           Current Medications       Current Outpatient Medications:     cyclobenzaprine (FLEXERIL) 5 mg tablet, , Disp: , Rfl:     dicyclomine (BENTYL) 20 mg tablet, Take 1 tablet (20 mg total) by mouth every 6 (six) hours for 5 days For crampy abdominal pain, Disp: 20 tablet, Rfl: 0    diphenhydrAMINE (BENADRYL) 25 mg tablet, Take 1 tablet (25 mg total) by mouth every 6 (six) hours (Patient not taking: Reported on 2020), Disp: 20 tablet, Rfl: 0    fluticasone (FLONASE) 50 mcg/act nasal spray, 1 spray into each nostril daily (Patient not taking: Reported on 2020), Disp: 16 g, Rfl: 0    medroxyPROGESTERone acetate (DEPO-PROVERA SYRINGE) 150 mg/mL injection, Inject 150 mg into a muscle, Disp: , Rfl:     methylPREDNISolone 4 MG tablet therapy pack, Use as directed on package (Patient not taking: Reported on 2/9/2020), Disp: 21 tablet, Rfl: 0    naproxen (NAPROSYN) 500 mg tablet, , Disp: , Rfl:     ondansetron (ZOFRAN-ODT) 4 mg disintegrating tablet, Take 1 tablet (4 mg total) by mouth every 6 (six) hours as needed for nausea or vomiting (Patient not taking: Reported on 2/9/2020), Disp: 20 tablet, Rfl: 0    pseudoephedrine (SUDAFED) 60 mg tablet, Take 1 tablet (60 mg total) by mouth every 6 (six) hours as needed for congestion (Patient not taking: Reported on 12/5/2018 ), Disp: 30 tablet, Rfl: 0    SUMAtriptan (IMITREX) 25 mg tablet, Take 1 tablet (25 mg total) by mouth once as needed for migraine for up to 1 dose (Patient not taking: Reported on 1/14/2019 ), Disp: 9 tablet, Rfl: 0    Current Allergies     Allergies as of 02/09/2020 - Reviewed 02/09/2020   Allergen Reaction Noted    Amoxicillin Hives 08/17/2016            The following portions of the patient's history were reviewed and updated as appropriate: allergies, current medications, past family history, past medical history, past social history, past surgical history and problem list      No past medical history on file  No past surgical history on file  Family History   Problem Relation Age of Onset    No Known Problems Mother     No Known Problems Father          Medications have been verified  Objective   /68   Pulse 82   Temp 97 7 °F (36 5 °C) (Temporal)   Resp 18   Ht 5' 5" (1 651 m)   Wt 78 4 kg (172 lb 12 8 oz)   SpO2 100%   BMI 28 76 kg/m²        Physical Exam     Physical Exam   Constitutional: She appears well-developed and well-nourished  HENT:   Head: Normocephalic and atraumatic  Neck: Normal range of motion  Neck supple  Cardiovascular: Normal rate, regular rhythm, normal heart sounds and intact distal pulses     Pulmonary/Chest: Effort normal and breath sounds normal  No respiratory distress  She has no wheezes  She has no rales  She exhibits no tenderness  Abdominal: Soft  Bowel sounds are normal  She exhibits no distension and no mass  There is no tenderness  There is no rebound and no guarding  Lymphadenopathy:     She has no cervical adenopathy

## 2020-02-12 LAB
BACTERIA UR CULT: ABNORMAL
BACTERIA UR CULT: ABNORMAL

## 2020-04-07 ENCOUNTER — OFFICE VISIT (OUTPATIENT)
Dept: URGENT CARE | Facility: MEDICAL CENTER | Age: 20
End: 2020-04-07
Payer: COMMERCIAL

## 2020-04-07 VITALS
OXYGEN SATURATION: 98 % | DIASTOLIC BLOOD PRESSURE: 59 MMHG | RESPIRATION RATE: 16 BRPM | WEIGHT: 180 LBS | SYSTOLIC BLOOD PRESSURE: 126 MMHG | TEMPERATURE: 99.4 F | HEIGHT: 65 IN | HEART RATE: 70 BPM | BODY MASS INDEX: 29.99 KG/M2

## 2020-04-07 DIAGNOSIS — R21 RASH: Primary | ICD-10-CM

## 2020-04-07 PROCEDURE — G0382 LEV 3 HOSP TYPE B ED VISIT: HCPCS | Performed by: PHYSICIAN ASSISTANT

## 2020-04-07 PROCEDURE — 99213 OFFICE O/P EST LOW 20 MIN: CPT | Performed by: PHYSICIAN ASSISTANT

## 2020-04-07 PROCEDURE — 99283 EMERGENCY DEPT VISIT LOW MDM: CPT | Performed by: PHYSICIAN ASSISTANT

## 2020-04-07 RX ORDER — PREDNISONE 20 MG/1
40 TABLET ORAL DAILY
Qty: 10 TABLET | Refills: 0 | Status: SHIPPED | OUTPATIENT
Start: 2020-04-07 | End: 2020-04-12

## 2020-08-27 ENCOUNTER — OFFICE VISIT (OUTPATIENT)
Dept: URGENT CARE | Facility: MEDICAL CENTER | Age: 20
End: 2020-08-27
Payer: COMMERCIAL

## 2020-08-27 VITALS
HEART RATE: 87 BPM | SYSTOLIC BLOOD PRESSURE: 122 MMHG | TEMPERATURE: 97.6 F | OXYGEN SATURATION: 98 % | BODY MASS INDEX: 31.55 KG/M2 | HEIGHT: 64 IN | WEIGHT: 184.8 LBS | DIASTOLIC BLOOD PRESSURE: 57 MMHG

## 2020-08-27 DIAGNOSIS — M77.52 LEFT ANKLE TENDONITIS: Primary | ICD-10-CM

## 2020-08-27 PROCEDURE — G0382 LEV 3 HOSP TYPE B ED VISIT: HCPCS | Performed by: PHYSICIAN ASSISTANT

## 2020-08-27 PROCEDURE — 99213 OFFICE O/P EST LOW 20 MIN: CPT | Performed by: PHYSICIAN ASSISTANT

## 2020-08-27 PROCEDURE — 99283 EMERGENCY DEPT VISIT LOW MDM: CPT | Performed by: PHYSICIAN ASSISTANT

## 2020-08-27 NOTE — PATIENT INSTRUCTIONS
1  Apply ICE to area 10-15min 3-4x daily  2  Motrin as needed for pain  3  Activities as tolerated until pain free  4   Recommend consult with Orthopedics if symptoms persist

## 2020-08-27 NOTE — PROGRESS NOTES
[facility]        NAME: Ynes Issa is a 23 y o  female  : 2000    MRN: [id number]  DATE: 2020  TIME: 5:34 PM    Assessment and Plan   Left ankle tendonitis [M77 52]  1  Left ankle tendonitis           Patient Instructions     1  Apply ICE to area 10-15min 3-4x daily  2  Motrin as needed for pain  3  Activities as tolerated until pain free  4  Recommend consult with Orthopedics if symptoms persist    Chief Complaint     Chief Complaint   Patient presents with    Ankle Pain     Left Ankle: happened last week sometime and has NO IDEA WHAT HAPPENED  However, there was swelling on the first day that she noticed pain         History of Present Illness       Jessica Velez is a 40-year-old female presents with left ankle pain x1 week  Patient denies any fall or trauma, she denies any inciting event  Patient reports her symptoms are primarily with weight-bearing activity, she has no pain at rest or night pain  Patient denies any new activities or change in foot wear  Review of Systems   Review of Systems   Constitutional: Negative  HENT: Negative  Musculoskeletal: Positive for myalgias  Negative for joint swelling  Current Medications       Current Outpatient Medications:     diphenhydrAMINE (BENADRYL) 25 mg tablet, Take 1 tablet (25 mg total) by mouth every 6 (six) hours (Patient not taking: Reported on 2020), Disp: 20 tablet, Rfl: 0    fluticasone (FLONASE) 50 mcg/act nasal spray, 1 spray into each nostril daily (Patient not taking: Reported on 2020), Disp: 16 g, Rfl: 0    Current Allergies     Allergies as of 2020 - Reviewed 2020   Allergen Reaction Noted    Amoxicillin Hives 2016            The following portions of the patient's history were reviewed and updated as appropriate: allergies, current medications, past family history, past medical history, past social history, past surgical history and problem list      History reviewed   No pertinent past medical history  History reviewed  No pertinent surgical history  Family History   Problem Relation Age of Onset    No Known Problems Mother     No Known Problems Father          Medications have been verified  Objective   /57   Pulse 87   Temp 97 6 °F (36 4 °C) (Temporal)   Ht 5' 4" (1 626 m)   Wt 83 8 kg (184 lb 12 8 oz)   SpO2 98%   BMI 31 72 kg/m²        Physical Exam     Physical Exam  Constitutional:       General: She is not in acute distress  Appearance: Normal appearance  Cardiovascular:      Rate and Rhythm: Normal rate and regular rhythm  Heart sounds: No murmur  Pulmonary:      Effort: Pulmonary effort is normal       Breath sounds: Normal breath sounds  Musculoskeletal:        Feet:    Neurological:      Mental Status: She is alert

## 2020-09-05 ENCOUNTER — OFFICE VISIT (OUTPATIENT)
Dept: URGENT CARE | Facility: MEDICAL CENTER | Age: 20
End: 2020-09-05
Payer: COMMERCIAL

## 2020-09-05 VITALS
HEIGHT: 65 IN | TEMPERATURE: 97.1 F | BODY MASS INDEX: 30.82 KG/M2 | RESPIRATION RATE: 18 BRPM | WEIGHT: 185 LBS | OXYGEN SATURATION: 100 % | HEART RATE: 68 BPM

## 2020-09-05 DIAGNOSIS — S93.402D SPRAIN OF LEFT ANKLE, UNSPECIFIED LIGAMENT, SUBSEQUENT ENCOUNTER: Primary | ICD-10-CM

## 2020-09-05 PROCEDURE — G0382 LEV 3 HOSP TYPE B ED VISIT: HCPCS | Performed by: PHYSICIAN ASSISTANT

## 2020-09-05 PROCEDURE — 99213 OFFICE O/P EST LOW 20 MIN: CPT | Performed by: PHYSICIAN ASSISTANT

## 2020-09-05 PROCEDURE — 99283 EMERGENCY DEPT VISIT LOW MDM: CPT | Performed by: PHYSICIAN ASSISTANT

## 2020-09-05 RX ORDER — IBUPROFEN 400 MG/1
TABLET ORAL EVERY 6 HOURS PRN
COMMUNITY

## 2020-09-05 NOTE — PATIENT INSTRUCTIONS
Sprain ankle  Rest, ice, elevate  Follow up with PCP in 3-5 days  Proceed to  ER if symptoms worsen  Sprain   WHAT YOU NEED TO KNOW:   A sprain happens when a ligament is stretched or torn  Ligaments are tough tissues that connect bones  Ligaments support your joints and keep your bones in place  They allow you to lift, lower, or rotate your arms and legs  A sprain may involve one or more ligaments  DISCHARGE INSTRUCTIONS:   Return to the emergency department if:   · You have numbness or tingling below the injury, such as in your fingers or toes  · The skin over your sprained area is blue or pale  · Your pain has increased or returned, even after you take pain medicine  Contact your healthcare provider if:   · Your symptoms do not better  · Your swelling has increased or returned  · Your joint becomes more weak or unstable  · You have questions or concerns about your condition or care  Medicines:   · Prescription pain medicine  may be given  Ask how to take this medicine safely  · Acetaminophen  decreases pain and fever  It is available without a doctor's order  Ask how much to take and how often to take it  Follow directions  Acetaminophen can cause liver damage if not taken correctly  · NSAIDs , such as ibuprofen, help decrease swelling, pain, and fever  This medicine is available with or without a doctor's order  NSAIDs can cause stomach bleeding or kidney problems in certain people  If you take blood thinner medicine, always ask your healthcare provider if NSAIDs are safe for you  Always read the medicine label and follow directions  · Take your medicine as directed  Contact your healthcare provider if you think your medicine is not helping or if you have side effects  Tell him or her if you are allergic to any medicine  Keep a list of the medicines, vitamins, and herbs you take  Include the amounts, and when and why you take them   Bring the list or the pill bottles to follow-up visits  Carry your medicine list with you in case of an emergency  Support devices:  Support services, such as an elastic bandage, splint, brace, or cast may be needed  These devices limit your movement and protect your joint  You may need to use crutches if the sprain is in your leg  This will help decrease your pain as you move around  Self-care:   · Rest  your joint so that it can heal  Return to normal activities as directed  · Apply ice  on your injury for 15 to 20 minutes every hour or as directed  Use an ice pack, or put crushed ice in a plastic bag  Cover it with a towel  Ice helps prevent tissue damage and decreases swelling and pain  · Compress  the injured area as directed  Ask your healthcare provider if you should wrap an elastic bandage around your injured ligament  An elastic bandage provides support and helps decrease swelling and movement so your joint can heal      · Elevate  the injured area above the level of your heart as often as you can  This will help decrease swelling and pain  Prop the injured area on pillows or blankets to keep it elevated comfortably  Physical therapy:  A physical therapist teaches you exercises to help improve movement and strength, and to decrease pain  Prevent another sprain:  Regular exercise can strengthen your muscles and help prevent another injury  Do the following before you begin or return to regular exercise or sports training:  · Ask your healthcare provider about the activities you can do  Find out how long your ligament needs to heal  Do not do any physical activity until your healthcare provider says it is okay  If you start activity too soon, you may develop a more serious injury  · Always warm up and stretch  before your regular exercise, sport, or physical activity  · Take it slow  Slowly increase how often and how long you exercise or train  Sudden increases in how often you train may cause you to overstretch or tear your ligament  · Use the right equipment  Always wear shoes that fit well and are made for the activity that you are doing  You may also use ankle supports, elbow and knee pads, or braces  Follow up with your healthcare provider as directed:  Write down your questions so you remember to ask them during your visits  © 2017 [employer] Information is for End User's use only and may not be sold, redistributed or otherwise used for commercial purposes  All illustrations and images included in CareNotes® are the copyrighted property of [employer] , [employer]  or Jose Balderrama  The above information is an  only  It is not intended as medical advice for individual conditions or treatments  Talk to your doctor, nurse or pharmacist before following any medical regimen to see if it is safe and effective for you

## 2020-09-05 NOTE — PROGRESS NOTES
[facility]        NAME: Rell Sue is a 23 y o  female  : 2000    MRN: [id number]  DATE: 2020  TIME: 1:34 PM    Assessment and Plan   Sprain of left ankle, unspecified ligament, subsequent encounter [A15 402D]  1  Sprain of left ankle, unspecified ligament, subsequent encounter           Patient Instructions     Sprain ankle  Rest, ice, elevate  Follow up with PCP in 3-5 days  Proceed to  ER if symptoms worsen  Chief Complaint     Chief Complaint   Patient presents with    Ankle Pain     Pt  with left ankle pain for the past 2 weeks  Was seen here for the same   History of Present Illness       22 y/o female presents c/o left ankle pain  States she had a sprain and it hurt more after standing on it for a long time at work  Denies new injury      Review of Systems   Review of Systems   Constitutional: Negative  HENT: Negative  Eyes: Negative  Respiratory: Negative  Negative for cough, chest tightness, shortness of breath, wheezing and stridor  Cardiovascular: Negative  Negative for chest pain, palpitations and leg swelling  Musculoskeletal: Positive for arthralgias           Current Medications       Current Outpatient Medications:     ibuprofen (MOTRIN) 400 mg tablet, Take by mouth every 6 (six) hours as needed for mild pain, Disp: , Rfl:     diphenhydrAMINE (BENADRYL) 25 mg tablet, Take 1 tablet (25 mg total) by mouth every 6 (six) hours (Patient not taking: Reported on 2020), Disp: 20 tablet, Rfl: 0    fluticasone (FLONASE) 50 mcg/act nasal spray, 1 spray into each nostril daily (Patient not taking: Reported on 2020), Disp: 16 g, Rfl: 0    Current Allergies     Allergies as of 2020 - Reviewed 2020   Allergen Reaction Noted    Amoxicillin Hives 2016            The following portions of the patient's history were reviewed and updated as appropriate: allergies, current medications, past family history, past medical history, past social history, past surgical history and problem list      No past medical history on file  No past surgical history on file  Family History   Problem Relation Age of Onset    No Known Problems Mother     No Known Problems Father          Medications have been verified  Objective   Pulse 68   Temp (!) 97 1 °F (36 2 °C) (Temporal)   Resp 18   Ht 5' 5" (1 651 m)   Wt 83 9 kg (185 lb)   SpO2 100%   BMI 30 79 kg/m²        Physical Exam     Physical Exam  Constitutional:       Appearance: She is well-developed  HENT:      Head: Normocephalic and atraumatic  Right Ear: External ear normal       Left Ear: External ear normal       Nose: Nose normal       Mouth/Throat:      Pharynx: No oropharyngeal exudate  Neck:      Musculoskeletal: Normal range of motion and neck supple  Cardiovascular:      Rate and Rhythm: Normal rate and regular rhythm  Heart sounds: Normal heart sounds  Pulmonary:      Effort: Pulmonary effort is normal  No respiratory distress  Breath sounds: Normal breath sounds  No wheezing or rales  Chest:      Chest wall: No tenderness  Musculoskeletal:      Left ankle: She exhibits normal range of motion, no swelling, no ecchymosis, no deformity, no laceration and normal pulse  Tenderness  CF ligament tenderness found  Lymphadenopathy:      Cervical: No cervical adenopathy

## 2020-09-05 NOTE — LETTER
September 5, 2020     Patient: Ibeth Zapata   YOB: 2000   Date of Visit: 9/5/2020       To Whom it May Concern:    Ibeth Zapata was seen in my clinic on 9/5/2020  She may return to work on 09/07/2020  If you have any questions or concerns, please don't hesitate to call           Sincerely,          [facility]        CC: Ibeth Zapata

## 2020-11-08 ENCOUNTER — OFFICE VISIT (OUTPATIENT)
Dept: URGENT CARE | Facility: MEDICAL CENTER | Age: 20
End: 2020-11-08
Payer: COMMERCIAL

## 2020-11-08 VITALS
OXYGEN SATURATION: 100 % | RESPIRATION RATE: 18 BRPM | BODY MASS INDEX: 30.82 KG/M2 | HEIGHT: 65 IN | TEMPERATURE: 98.2 F | WEIGHT: 185 LBS | HEART RATE: 80 BPM

## 2020-11-08 DIAGNOSIS — R11.0 NAUSEA: Primary | ICD-10-CM

## 2020-11-08 PROCEDURE — G0382 LEV 3 HOSP TYPE B ED VISIT: HCPCS | Performed by: PHYSICIAN ASSISTANT

## 2020-11-08 PROCEDURE — 99283 EMERGENCY DEPT VISIT LOW MDM: CPT | Performed by: PHYSICIAN ASSISTANT

## 2020-11-08 PROCEDURE — 99213 OFFICE O/P EST LOW 20 MIN: CPT | Performed by: PHYSICIAN ASSISTANT

## 2020-11-08 RX ORDER — ONDANSETRON 4 MG/1
4 TABLET, FILM COATED ORAL EVERY 8 HOURS PRN
Qty: 20 TABLET | Refills: 0 | Status: SHIPPED | OUTPATIENT
Start: 2020-11-08

## 2021-03-23 ENCOUNTER — APPOINTMENT (EMERGENCY)
Dept: ULTRASOUND IMAGING | Facility: HOSPITAL | Age: 21
End: 2021-03-23
Payer: COMMERCIAL

## 2021-03-23 ENCOUNTER — HOSPITAL ENCOUNTER (EMERGENCY)
Facility: HOSPITAL | Age: 21
Discharge: HOME | End: 2021-03-23
Attending: EMERGENCY MEDICINE
Payer: COMMERCIAL

## 2021-03-23 VITALS
RESPIRATION RATE: 16 BRPM | SYSTOLIC BLOOD PRESSURE: 126 MMHG | HEART RATE: 86 BPM | DIASTOLIC BLOOD PRESSURE: 60 MMHG | TEMPERATURE: 97.8 F | OXYGEN SATURATION: 99 %

## 2021-03-23 DIAGNOSIS — O20.0 THREATENED MISCARRIAGE IN EARLY PREGNANCY: Primary | ICD-10-CM

## 2021-03-23 LAB
ABO GROUP BLD: NORMAL
AMORPH PHOS CRY URNS QL MICRO: ABNORMAL /HPF
ANION GAP SERPL CALCULATED.3IONS-SCNC: 9 MMOL/L (ref 4–13)
B-HCG SERPL-ACNC: 147 MIU/ML
BACTERIA UR QL AUTO: ABNORMAL /HPF
BASOPHILS # BLD AUTO: 0.02 THOUSANDS/ΜL (ref 0–0.1)
BASOPHILS NFR BLD AUTO: 0 % (ref 0–1)
BILIRUB UR QL STRIP: NEGATIVE
BUN SERPL-MCNC: 8 MG/DL (ref 5–25)
CALCIUM SERPL-MCNC: 8.8 MG/DL (ref 8.3–10.1)
CHLORIDE SERPL-SCNC: 104 MMOL/L (ref 100–108)
CLARITY UR: ABNORMAL
CO2 SERPL-SCNC: 26 MMOL/L (ref 21–32)
COLOR UR: YELLOW
CREAT SERPL-MCNC: 0.73 MG/DL (ref 0.6–1.3)
EOSINOPHIL # BLD AUTO: 0.04 THOUSAND/ΜL (ref 0–0.61)
EOSINOPHIL NFR BLD AUTO: 1 % (ref 0–6)
ERYTHROCYTE [DISTWIDTH] IN BLOOD BY AUTOMATED COUNT: 11.9 % (ref 11.6–15.1)
EXT PREG TEST URINE: NEGATIVE
EXT. CONTROL ED NAV: NORMAL
GFR SERPL CREATININE-BSD FRML MDRD: 117 ML/MIN/1.73SQ M
GLUCOSE SERPL-MCNC: 88 MG/DL (ref 65–140)
GLUCOSE UR STRIP-MCNC: NEGATIVE MG/DL
HCT VFR BLD AUTO: 38.7 % (ref 34.8–46.1)
HGB BLD-MCNC: 13 G/DL (ref 11.5–15.4)
HGB UR QL STRIP.AUTO: ABNORMAL
IMM GRANULOCYTES # BLD AUTO: 0.03 THOUSAND/UL (ref 0–0.2)
IMM GRANULOCYTES NFR BLD AUTO: 0 % (ref 0–2)
KETONES UR STRIP-MCNC: NEGATIVE MG/DL
LEUKOCYTE ESTERASE UR QL STRIP: NEGATIVE
LYMPHOCYTES # BLD AUTO: 1.78 THOUSANDS/ΜL (ref 0.6–4.47)
LYMPHOCYTES NFR BLD AUTO: 20 % (ref 14–44)
MCH RBC QN AUTO: 29.3 PG (ref 26.8–34.3)
MCHC RBC AUTO-ENTMCNC: 33.6 G/DL (ref 31.4–37.4)
MCV RBC AUTO: 87 FL (ref 82–98)
MONOCYTES # BLD AUTO: 0.72 THOUSAND/ΜL (ref 0.17–1.22)
MONOCYTES NFR BLD AUTO: 8 % (ref 4–12)
NEUTROPHILS # BLD AUTO: 6.18 THOUSANDS/ΜL (ref 1.85–7.62)
NEUTS SEG NFR BLD AUTO: 71 % (ref 43–75)
NITRITE UR QL STRIP: NEGATIVE
NON-SQ EPI CELLS URNS QL MICRO: ABNORMAL /HPF
NRBC BLD AUTO-RTO: 0 /100 WBCS
PH UR STRIP.AUTO: 8 [PH]
PLATELET # BLD AUTO: 267 THOUSANDS/UL (ref 149–390)
PMV BLD AUTO: 9.6 FL (ref 8.9–12.7)
POTASSIUM SERPL-SCNC: 4.2 MMOL/L (ref 3.5–5.3)
PROT UR STRIP-MCNC: NEGATIVE MG/DL
RBC # BLD AUTO: 4.44 MILLION/UL (ref 3.81–5.12)
RBC #/AREA URNS AUTO: ABNORMAL /HPF
RH BLD: POSITIVE
SODIUM SERPL-SCNC: 139 MMOL/L (ref 136–145)
SP GR UR STRIP.AUTO: 1.01 (ref 1–1.03)
UROBILINOGEN UR QL STRIP.AUTO: 1 E.U./DL
WBC # BLD AUTO: 8.77 THOUSAND/UL (ref 4.31–10.16)
WBC #/AREA URNS AUTO: ABNORMAL /HPF

## 2021-03-23 PROCEDURE — 81025 URINE PREGNANCY TEST: CPT | Performed by: EMERGENCY MEDICINE

## 2021-03-23 PROCEDURE — 36415 COLL VENOUS BLD VENIPUNCTURE: CPT | Performed by: EMERGENCY MEDICINE

## 2021-03-23 PROCEDURE — 84702 CHORIONIC GONADOTROPIN TEST: CPT | Performed by: EMERGENCY MEDICINE

## 2021-03-23 PROCEDURE — 80048 BASIC METABOLIC PNL TOTAL CA: CPT | Performed by: EMERGENCY MEDICINE

## 2021-03-23 PROCEDURE — 81001 URINALYSIS AUTO W/SCOPE: CPT | Performed by: EMERGENCY MEDICINE

## 2021-03-23 PROCEDURE — 99284 EMERGENCY DEPT VISIT MOD MDM: CPT | Performed by: EMERGENCY MEDICINE

## 2021-03-23 PROCEDURE — 86900 BLOOD TYPING SEROLOGIC ABO: CPT | Performed by: EMERGENCY MEDICINE

## 2021-03-23 PROCEDURE — 76815 OB US LIMITED FETUS(S): CPT

## 2021-03-23 PROCEDURE — 87086 URINE CULTURE/COLONY COUNT: CPT | Performed by: EMERGENCY MEDICINE

## 2021-03-23 PROCEDURE — 86901 BLOOD TYPING SEROLOGIC RH(D): CPT | Performed by: EMERGENCY MEDICINE

## 2021-03-23 PROCEDURE — 99284 EMERGENCY DEPT VISIT MOD MDM: CPT

## 2021-03-23 PROCEDURE — 85025 COMPLETE CBC W/AUTO DIFF WBC: CPT | Performed by: EMERGENCY MEDICINE

## 2021-03-23 NOTE — ED PROVIDER NOTES
History  Chief Complaint   Patient presents with    Vaginal Bleeding - Pregnant     Pt states she is 5 weeks pregnant and started bleeding this morning      22 y/o female  currently at about 5 weeks gestation by dates, presents to the ED for vaginal bleeding since this morning  Patient states that she noticed light vaginal bleeding  States that she has only used 1 pad throughout the day  She denies seeing at clots or fetal tissue  Denies abd pain, n/v, d/c, or urianry symptoms  States that the [facility] was   She took home pregnancy tests but has not had any other confirmatory testing  No other complaints  History provided by:  Patient  Vaginal Bleeding  Quality:  Lighter than menses  Severity:  Moderate  Onset quality:  Sudden  Timing:  Constant  Progression:  Worsening  Chronicity:  New  Menstrual history:  Missed period  Number of pads used:  1  Possible pregnancy: yes    Context: spontaneously    Relieved by:  None tried  Worsened by:  Nothing  Ineffective treatments:  None tried  Associated symptoms: no abdominal pain, no back pain, no dysuria, no fever and no nausea    Risk factors: no hx of ectopic pregnancy and no prior miscarriage        Prior to Admission Medications   Prescriptions Last Dose Informant Patient Reported? Taking? diphenhydrAMINE (BENADRYL) 25 mg tablet   No No   Sig: Take 1 tablet (25 mg total) by mouth every 6 (six) hours   Patient not taking: Reported on 2020   fluticasone (FLONASE) 50 mcg/act nasal spray  Self No No   Si spray into each nostril daily   Patient not taking: Reported on 2020   ibuprofen (MOTRIN) 400 mg tablet   Yes No   Sig: Take by mouth every 6 (six) hours as needed for mild pain   ondansetron (ZOFRAN) 4 mg tablet   No No   Sig: Take 1 tablet (4 mg total) by mouth every 8 (eight) hours as needed for nausea or vomiting      Facility-Administered Medications: None       History reviewed  No pertinent past medical history  History reviewed   No pertinent surgical history  Family History   Problem Relation Age of Onset    No Known Problems Mother     No Known Problems Father      I have reviewed and agree with the history as documented  E-Cigarette/Vaping    E-Cigarette Use Never User      E-Cigarette/Vaping Substances     Social History     Tobacco Use    Smoking status: Never Smoker    Smokeless tobacco: Never Used   Substance Use Topics    Alcohol use: No    Drug use: No       Review of Systems   Constitutional: Negative for chills and fever  HENT: Negative for congestion, ear pain and sore throat  Eyes: Negative for pain and visual disturbance  Respiratory: Negative for cough, shortness of breath and wheezing  Cardiovascular: Negative for chest pain and leg swelling  Gastrointestinal: Negative for abdominal pain, diarrhea, nausea and vomiting  Genitourinary: Positive for vaginal bleeding  Negative for dysuria, frequency, hematuria and urgency  Musculoskeletal: Negative for back pain, neck pain and neck stiffness  Skin: Negative for rash and wound  Neurological: Negative for weakness, numbness and headaches  Psychiatric/Behavioral: Negative for agitation and confusion  All other systems reviewed and are negative  Physical Exam  Physical Exam  Vitals signs and nursing note reviewed  Constitutional:       Appearance: She is well-developed  HENT:      Head: Normocephalic and atraumatic  Eyes:      Pupils: Pupils are equal, round, and reactive to light  Neck:      Musculoskeletal: Normal range of motion and neck supple  Cardiovascular:      Rate and Rhythm: Normal rate and regular rhythm  Pulmonary:      Effort: Pulmonary effort is normal       Breath sounds: Normal breath sounds  Abdominal:      General: Bowel sounds are normal  There is no distension  Palpations: Abdomen is soft  Tenderness: There is no abdominal tenderness  Musculoskeletal: Normal range of motion     Skin:     General: Skin is warm and dry  Neurological:      General: No focal deficit present  Mental Status: She is alert and oriented to person, place, and time  Comments: No focal deficits         Vital Signs  ED Triage Vitals [03/23/21 1559]   Temperature Pulse Respirations Blood Pressure SpO2   97 8 °F (36 6 °C) 86 16 126/60 99 %      Temp src Heart Rate Source Patient Position - Orthostatic VS BP Location FiO2 (%)   -- Monitor Sitting Left arm --      Pain Score       --           Vitals:    03/23/21 1559   BP: 126/60   Pulse: 86   Patient Position - Orthostatic VS: Sitting         Visual Acuity      ED Medications  Medications - No data to display    Diagnostic Studies  Results Reviewed     Procedure Component Value Units Date/Time    Urine Microscopic [[id number]]  (Abnormal) Collected: 03/23/21 1738    Lab Status: Edited Result - FINAL Specimen: Urine, Clean Catch Updated: 03/23/21 1828     RBC, UA 4-10 /hpf      WBC, UA None Seen /hpf      Epithelial Cells None Seen /hpf      Bacteria, UA Occasional /hpf      AMORPH PHOSPATES Moderate /hpf      MUCUS THREADS --     URINE COMMENT --    Narrative:      Phoned Trinidad Blanchard    POCT pregnancy, urine [[id number]]  (Normal) Resulted: 03/23/21 1738    Lab Status: Final result Updated: 03/23/21 1758     EXT PREG TEST UR (Ref: Negative) negative     Control valid    UA w Reflex to Microscopic w Reflex to Culture [[id number]]  (Abnormal) Collected: 03/23/21 1738    Lab Status: Final result Specimen: Urine, Clean Catch Updated: 03/23/21 1746     Color, UA Yellow     Clarity, UA Cloudy     Specific Gravity, UA 1 015     pH, UA 8 0     Leukocytes, UA Negative     Nitrite, UA Negative     Protein, UA Negative mg/dl      Glucose, UA Negative mg/dl      Ketones, UA Negative mg/dl      Urobilinogen, UA 1 0 E U /dl      Bilirubin, UA Negative     Blood, UA Large     URINE COMMENT --    Urine culture [[id number]] Collected: 03/23/21 1738    Lab Status:  In process Specimen: Urine, Clean Catch Updated: 03/23/21 5592    Basic metabolic panel [[id number]] Collected: 03/23/21 1641    Lab Status: Final result Specimen: Blood from Arm, Right Updated: 03/23/21 1714     Sodium 139 mmol/L      Potassium 4 2 mmol/L      Chloride 104 mmol/L      CO2 26 mmol/L      ANION GAP 9 mmol/L      BUN 8 mg/dL      Creatinine 0 73 mg/dL      Glucose 88 mg/dL      Calcium 8 8 mg/dL      eGFR 117 ml/min/1 73sq m     Narrative:      [place] guidelines for Chronic Kidney Disease (CKD):     Stage 1 with normal or high GFR (GFR > 90 mL/min/1 73 square meters)    Stage 2 Mild CKD (GFR = 60-89 mL/min/1 73 square meters)    Stage 3A Moderate CKD (GFR = 45-59 mL/min/1 73 square meters)    Stage 3B Moderate CKD (GFR = 30-44 mL/min/1 73 square meters)    Stage 4 Severe CKD (GFR = 15-29 mL/min/1 73 square meters)    Stage 5 End Stage CKD (GFR <15 mL/min/1 73 square meters)  Note: GFR calculation is accurate only with a steady state creatinine    hCG, quantitative [[id number]]  (Abnormal) Collected: 03/23/21 1641    Lab Status: Final result Specimen: Blood from Arm, Right Updated: 03/23/21 1714     HCG, Quant 147 mIU/mL     Narrative:       Expected Ranges:     Approximate               Approximate HCG  Gestation age          Concentration ( mIU/mL)  _____________          ______________________   Viola Mealy                      HCG values  0 2-1                       5-50  1-2                           2-3                         100-5000  3-4                         500-47178  4-5                         1000-31639  5-6                         [id number]  6-8                         [id number]  8-12                        [id number]      CBC and differential [[id number]] Collected: 03/23/21 1641    Lab Status: Final result Specimen: Blood from Arm, Right Updated: 03/23/21 1650     WBC 8 77 Thousand/uL      RBC 4 44 Million/uL      Hemoglobin 13 0 g/dL      Hematocrit 38 7 %      MCV 87 fL MCH 29 3 pg      MCHC 33 6 g/dL      RDW 11 9 %      MPV 9 6 fL      Platelets 025 Thousands/uL      nRBC 0 /100 WBCs      Neutrophils Relative 71 %      Immat GRANS % 0 %      Lymphocytes Relative 20 %      Monocytes Relative 8 %      Eosinophils Relative 1 %      Basophils Relative 0 %      Neutrophils Absolute 6 18 Thousands/µL      Immature Grans Absolute 0 03 Thousand/uL      Lymphocytes Absolute 1 78 Thousands/µL      Monocytes Absolute 0 72 Thousand/µL      Eosinophils Absolute 0 04 Thousand/µL      Basophils Absolute 0 02 Thousands/µL                  US OB pregnancy limited with transvaginal   Final Result by Vito Aguilera MD (03/23 1751)       No intrauterine pregnancy  No retained products of conception  No sonographic findings to suggest ectopic  Workstation performed: [id number]                    Procedures  Procedures         ED Course  ED Course as of Mar 23 1912   Tue Mar 23, 2021   1719 Rh Factor: Positive                             SBIRT 22yo+      Most Recent Value   SBIRT (22 yo +)   In order to provide better care to our patients, we are screening all of our patients for alcohol and drug use  Would it be okay to ask you these screening questions? Yes Filed at: 03/23/2021 1643   Initial Alcohol Screen: US AUDIT-C    1  How often do you have a drink containing alcohol?  0 Filed at: 03/23/2021 1643   2  How many drinks containing alcohol do you have on a typical day you are drinking? 0 Filed at: 03/23/2021 1643   3a  Male UNDER 65: How often do you have five or more drinks on one occasion? 0 Filed at: 03/23/2021 1643   3b  FEMALE Any Age, or MALE 65+: How often do you have 4 or more drinks on one occassion? 0 Filed at: 03/23/2021 1643   Audit-C Score  0 Filed at: 03/23/2021 1643   VAMSI: How many times in the past year have you    Used an illegal drug or used a prescription medication for non-medical reasons?   Never Filed at: 03/23/2021 1643                    [facility]  Number of Diagnoses or Management Options  Threatened miscarriage in early pregnancy: new and requires workup  Diagnosis management comments: Patient with vaginal bleeding in pregnancy  Will get labs, hcg quant, type and screen, and US to r/o ectopic  Patient reevaluated and feels improved  Patient updated on results of tests  Discharge instructions given including follow-up, and return precautions  Patient demonstrates verbal understanding and agrees with plan  Amount and/or Complexity of Data Reviewed  Clinical lab tests: ordered and reviewed  Tests in the radiology section of CPT®: ordered and reviewed  Tests in the medicine section of CPT®: ordered and reviewed  Discussion of test results with the performing providers: yes  Decide to obtain previous medical records or to obtain history from someone other than the patient: yes  Obtain history from someone other than the patient: yes  Review and summarize past medical records: yes  Discuss the patient with other providers: yes  Independent visualization of images, tracings, or specimens: yes    Patient Progress  Patient progress: improved      Disposition  Final diagnoses:   Threatened miscarriage in early pregnancy     Time reflects when diagnosis was documented in both MDM as applicable and the Disposition within this note     Time User Action Codes Description Comment    3/23/2021  6:17 PM Paul GLASS Add [O20 0] Threatened miscarriage in early pregnancy       ED Disposition     ED Disposition Condition Date/Time Comment    Discharge Stable Tue Mar 23, 2021  6:17 PM Justin Warren discharge to home/self care              Follow-up Information     Follow up With Specialties Details Why Contact Info Additional Information    your obgyn  Call in 1 day for follow up within 1 week      [address] [facility] Emergency Department Emergency Medicine Go to  immediately for any new or worsening symptoms [address] [facility] [address] [facility] Emergency Department, [address], [place], [address]          Patient's Medications   Discharge Prescriptions    No medications on file     Outpatient Discharge Orders   hCG, quantitative   Standing Status: Future Standing Exp   Date: 03/23/22       PDMP Review     None          ED Provider  Electronically Signed by           Lilli Grey DO  03/23/21 7293

## 2021-03-25 LAB — BACTERIA UR CULT: NORMAL

## 2021-05-22 ENCOUNTER — IMMUNIZATIONS (OUTPATIENT)
Dept: FAMILY MEDICINE CLINIC | Facility: HOSPITAL | Age: 21
End: 2021-05-22

## 2021-05-22 DIAGNOSIS — Z23 ENCOUNTER FOR IMMUNIZATION: Primary | ICD-10-CM

## 2021-05-22 PROCEDURE — 0001A SARS-COV-2 / COVID-19 MRNA VACCINE (PFIZER-BIONTECH) 30 MCG: CPT

## 2021-05-22 PROCEDURE — 91300 SARS-COV-2 / COVID-19 MRNA VACCINE (PFIZER-BIONTECH) 30 MCG: CPT

## 2021-06-12 ENCOUNTER — IMMUNIZATIONS (OUTPATIENT)
Dept: FAMILY MEDICINE CLINIC | Facility: HOSPITAL | Age: 21
End: 2021-06-12

## 2021-06-12 DIAGNOSIS — Z23 ENCOUNTER FOR IMMUNIZATION: Primary | ICD-10-CM

## 2021-06-12 PROCEDURE — 0002A SARS-COV-2 / COVID-19 MRNA VACCINE (PFIZER-BIONTECH) 30 MCG: CPT

## 2021-06-12 PROCEDURE — 91300 SARS-COV-2 / COVID-19 MRNA VACCINE (PFIZER-BIONTECH) 30 MCG: CPT

## 2021-08-10 ENCOUNTER — NURSE TRIAGE (OUTPATIENT)
Dept: OTHER | Facility: OTHER | Age: 21
End: 2021-08-10

## 2021-08-10 DIAGNOSIS — Z20.828 SARS-ASSOCIATED CORONAVIRUS EXPOSURE: Primary | ICD-10-CM

## 2021-08-11 NOTE — TELEPHONE ENCOUNTER
Reason for Disposition   [1] CLOSE CONTACT COVID-19 EXPOSURE within last 14 days AND [2] needs COVID-19 lab test to return to work AND [3] NO symptoms    Answer Assessment - Initial Assessment Questions  Were you within 6 feet or less, for up to 15 minutes or more with a person that has a confirmed COVID-19 test?        yes     What was the date of your exposure? Community spread     Are you experiencing any symptoms attributed to the virus?  (Assess for SOB, cough, fever, difficulty breathing)        denies     HIGH RISK: Do you have any history heart or lung conditions, weakened immune system, diabetes, Asthma, CHF, HIV, COPD, Chemo, renal failure, sickle cell, etc?        Denies     PREGNANCY: Are you pregnant or did you recently give birth?         Denies    Protocols used: CORONAVIRUS (COVID-19) EXPOSURE-ADULT-AH

## 2021-08-11 NOTE — TELEPHONE ENCOUNTER
Regarding: covid test request- no symptoms- no exposure   ----- Message from Jeffrey Catsro sent at 8/10/2021  3:03 PM EDT -----  "I started a new job and a lot of people are testing positive for covid and I want to get it just to be on the safe side  No symptoms   No direct exposure "

## 2021-08-25 ENCOUNTER — HOSPITAL ENCOUNTER (EMERGENCY)
Facility: HOSPITAL | Age: 21
Discharge: LEFT BEFORE DISCHARGE | End: 2021-08-25
Attending: EMERGENCY MEDICINE
Payer: COMMERCIAL

## 2021-08-25 VITALS
HEART RATE: 78 BPM | DIASTOLIC BLOOD PRESSURE: 60 MMHG | RESPIRATION RATE: 17 BRPM | SYSTOLIC BLOOD PRESSURE: 130 MMHG | OXYGEN SATURATION: 98 % | TEMPERATURE: 98.8 F

## 2021-08-25 LAB — SARS-COV-2 RNA RESP QL NAA+PROBE: NEGATIVE

## 2021-08-25 PROCEDURE — U0005 INFEC AGEN DETEC AMPLI PROBE: HCPCS | Performed by: EMERGENCY MEDICINE

## 2021-08-25 PROCEDURE — U0003 INFECTIOUS AGENT DETECTION BY NUCLEIC ACID (DNA OR RNA); SEVERE ACUTE RESPIRATORY SYNDROME CORONAVIRUS 2 (SARS-COV-2) (CORONAVIRUS DISEASE [COVID-19]), AMPLIFIED PROBE TECHNIQUE, MAKING USE OF HIGH THROUGHPUT TECHNOLOGIES AS DESCRIBED BY CMS-2020-01-R: HCPCS | Performed by: EMERGENCY MEDICINE

## 2021-11-12 ENCOUNTER — OFFICE VISIT (OUTPATIENT)
Dept: URGENT CARE | Facility: MEDICAL CENTER | Age: 21
End: 2021-11-12
Payer: COMMERCIAL

## 2021-11-12 DIAGNOSIS — Z02.5 SPORTS PHYSICAL: Primary | ICD-10-CM

## 2021-11-12 PROCEDURE — 86580 TB INTRADERMAL TEST: CPT | Performed by: PHYSICIAN ASSISTANT

## 2022-06-01 ENCOUNTER — OFFICE VISIT (OUTPATIENT)
Dept: URGENT CARE | Facility: MEDICAL CENTER | Age: 22
End: 2022-06-01
Payer: COMMERCIAL

## 2022-06-01 VITALS
SYSTOLIC BLOOD PRESSURE: 127 MMHG | OXYGEN SATURATION: 98 % | HEART RATE: 94 BPM | RESPIRATION RATE: 18 BRPM | DIASTOLIC BLOOD PRESSURE: 63 MMHG | WEIGHT: 152 LBS | BODY MASS INDEX: 25.33 KG/M2 | HEIGHT: 65 IN | TEMPERATURE: 97.5 F

## 2022-06-01 DIAGNOSIS — R09.81 NASAL CONGESTION: ICD-10-CM

## 2022-06-01 DIAGNOSIS — J06.9 ACUTE URI: Primary | ICD-10-CM

## 2022-06-01 PROCEDURE — 87636 SARSCOV2 & INF A&B AMP PRB: CPT | Performed by: PHYSICIAN ASSISTANT

## 2022-06-01 PROCEDURE — 99213 OFFICE O/P EST LOW 20 MIN: CPT | Performed by: PHYSICIAN ASSISTANT

## 2022-06-01 RX ORDER — MEDROXYPROGESTERONE ACETATE 150 MG/ML
INJECTION, SUSPENSION INTRAMUSCULAR
COMMUNITY
Start: 2022-05-10

## 2022-06-01 NOTE — LETTER
[address] [address]  Dept: [phone]    June 1, 2022    Patient: Elen Camarena  YOB: 2000    Elen Camarena was seen and evaluated at our [facility]  Please note if Covid and Flu tests are negative, they may return to work when fever free for 24 hours without the use of a fever reducing agent  If Covid or Flu test is positive, they may return to work on 06/06/2022, as this is 5 days from the onset of symptoms  Upon return, they must then adhere to strict masking for an additional 5 days      Sincerely,    Stacey Pena PA-C

## 2022-06-01 NOTE — PROGRESS NOTES
[facility]        NAME: Messi Maddox is a 23 y o  female  : 2000    MRN: [id number]  DATE: 2022  TIME: 9:47 AM    Assessment and Plan   Acute URI [J06 9]  1  Acute URI     2  Nasal congestion         Suspected COVID-19 infection or other viral infection  Swabbed for COVID-19/flu in office today  Continue supportive care including rest, hydration, acetaminophen and ibuprofen for pain and fever, OTC decongestants and nasal sprays   Educated on return precautions to PCP or to ED  Patient Instructions          Keep hydrated and rest as able  Check MY CHART in 24-48 hrs for Covid results  Home isolation until results come back; if + quarantine 5 days from the onset of symptoms, and fever free for 24 hrs  Wear your mask and wash hands often  PCP follow up in 3-5 days; call them first  Go to an emergency department if symptoms worsen, especially shortness or breath, weakness, or if unable to tolerate fluid intake  Chief Complaint     Chief Complaint   Patient presents with    Cold Like Symptoms     Pt  C/O cough, congestion, and sinus pressure for the past two days          History of Present Illness       Patient is a 24 yo female who presents for evaluation of nasal congestion, cough, sinus pressure x 2 days  Has not taken anything at home for her symptoms  Denies fevers, chills, fatigue, body aches, chest pain, SOB  Review of Systems   Review of Systems   Constitutional: Negative for chills, fatigue and fever  HENT: Positive for congestion and sinus pressure  Negative for sore throat  Respiratory: Positive for cough  Negative for shortness of breath  Cardiovascular: Negative for chest pain  Gastrointestinal: Negative for abdominal pain, diarrhea, nausea and vomiting  Musculoskeletal: Negative for arthralgias and myalgias  Skin: Negative for color change  Neurological: Negative for dizziness and headaches           Current Medications       Current Outpatient Medications:     medroxyPROGESTERone (DEPO-PROVERA) 150 mg/mL injection, INJECT 1 ML (150 MG TOTAL) INTO THE MUSCLE EVERY 3 (THREE) MONTHS , Disp: , Rfl:     diphenhydrAMINE (BENADRYL) 25 mg tablet, Take 1 tablet (25 mg total) by mouth every 6 (six) hours (Patient not taking: No sig reported), Disp: 20 tablet, Rfl: 0    fluticasone (FLONASE) 50 mcg/act nasal spray, 1 spray into each nostril daily (Patient not taking: No sig reported), Disp: 16 g, Rfl: 0    ibuprofen (MOTRIN) 400 mg tablet, Take by mouth every 6 (six) hours as needed for mild pain (Patient not taking: Reported on 6/1/2022), Disp: , Rfl:     ondansetron (ZOFRAN) 4 mg tablet, Take 1 tablet (4 mg total) by mouth every 8 (eight) hours as needed for nausea or vomiting (Patient not taking: Reported on 6/1/2022), Disp: 20 tablet, Rfl: 0    Current Allergies     Allergies as of 06/01/2022 - Reviewed 06/01/2022   Allergen Reaction Noted    Amoxicillin Hives 08/17/2016            The following portions of the patient's history were reviewed and updated as appropriate: allergies, current medications, past family history, past medical history, past social history, past surgical history and problem list      Past Medical History:   Diagnosis Date    Patient denies medical problems        No past surgical history on file  Family History   Problem Relation Age of Onset    No Known Problems Mother     No Known Problems Father          Medications have been verified  Objective   /63   Pulse 94   Temp 97 5 °F (36 4 °C)   Resp 18   Ht 5' 5" (1 651 m)   Wt 68 9 kg (152 lb)   SpO2 98%   BMI 25 29 kg/m²        Physical Exam     Physical Exam  Constitutional:       General: She is not in acute distress  Appearance: Normal appearance  She is not ill-appearing or diaphoretic     HENT:      Right Ear: Tympanic membrane, ear canal and external ear normal       Left Ear: Tympanic membrane, ear canal and external ear normal  Nose: Nose normal       Mouth/Throat:      Mouth: Mucous membranes are moist       Pharynx: Oropharynx is clear  Eyes:      Conjunctiva/sclera: Conjunctivae normal    Cardiovascular:      Rate and Rhythm: Normal rate and regular rhythm  Heart sounds: Normal heart sounds  Pulmonary:      Effort: Pulmonary effort is normal       Breath sounds: Normal breath sounds  Skin:     General: Skin is warm and dry  Neurological:      Mental Status: She is alert     Psychiatric:         Mood and Affect: Mood normal          Behavior: Behavior normal

## 2022-06-02 LAB
FLUAV RNA RESP QL NAA+PROBE: NEGATIVE
FLUBV RNA RESP QL NAA+PROBE: NEGATIVE
SARS-COV-2 RNA RESP QL NAA+PROBE: NEGATIVE

## 2022-08-27 ENCOUNTER — OFFICE VISIT (OUTPATIENT)
Dept: URGENT CARE | Facility: MEDICAL CENTER | Age: 22
End: 2022-08-27
Payer: COMMERCIAL

## 2022-08-27 VITALS
WEIGHT: 156 LBS | RESPIRATION RATE: 18 BRPM | HEART RATE: 62 BPM | TEMPERATURE: 98 F | DIASTOLIC BLOOD PRESSURE: 59 MMHG | HEIGHT: 65 IN | SYSTOLIC BLOOD PRESSURE: 120 MMHG | BODY MASS INDEX: 25.99 KG/M2 | OXYGEN SATURATION: 100 %

## 2022-08-27 DIAGNOSIS — K11.8 SALIVARY GLAND OBSTRUCTION: Primary | ICD-10-CM

## 2022-08-27 PROCEDURE — 99213 OFFICE O/P EST LOW 20 MIN: CPT | Performed by: PHYSICIAN ASSISTANT

## 2022-08-27 RX ORDER — IBUPROFEN 600 MG/1
600 TABLET ORAL EVERY 8 HOURS PRN
Qty: 15 TABLET | Refills: 0 | Status: SHIPPED | OUTPATIENT
Start: 2022-08-27 | End: 2022-09-01

## 2022-08-27 NOTE — PATIENT INSTRUCTIONS
1  Increase fluids  2  Increase sour or sugary foods (lemon drops) until obstruction resolves  3  Apply warm compresses to cheek for comfort  4  Take Motrin 600mg  Every 8 hours as needed for pain     Follow up with PCP in 3-5 days if symptoms persist

## 2022-08-27 NOTE — PROGRESS NOTES
[facility]        NAME: Jose Luis Haddad is a 23 y o  female  : 2000    MRN: [id number]  DATE: 2022  TIME: 10:36 AM    Assessment and Plan   Salivary gland obstruction [K11 8]  1  Salivary gland obstruction  ibuprofen (MOTRIN) 600 mg tablet         Patient Instructions     1  Increase fluids  2  Increase sour or sugary foods (lemon drops) until obstruction resolves  3  Apply warm compresses to cheek for comfort  4  Take Motrin 600mg  Every 8 hours as needed for pain  Follow up with PCP in 3-5 days if symptoms persist        Chief Complaint     Chief Complaint   Patient presents with    Facial Swelling     Pt  With swelling and pain to her right jaw that began two days ago after eating a bagel  Denies dental problems, denies ear or throat pain  History of Present Illness       Anisha Floyd is a 76-year-old female presents with pain and swelling to the right side of her face and jaw that developed over the past 24 hours  Patient reports she was eating a bagel 1 day prior when she started experiencing discomfort on the right side of her jaw  She denies any tooth pain or fever  She reports her pain primarily increases with eating or drinking  Review of Systems   Review of Systems   Constitutional: Negative  HENT: Positive for facial swelling  Negative for dental problem and mouth sores  Respiratory: Negative  Gastrointestinal: Negative            Current Medications       Current Outpatient Medications:     ibuprofen (MOTRIN) 600 mg tablet, Take 1 tablet (600 mg total) by mouth every 8 (eight) hours as needed for mild pain or moderate pain for up to 5 days, Disp: 15 tablet, Rfl: 0    diphenhydrAMINE (BENADRYL) 25 mg tablet, Take 1 tablet (25 mg total) by mouth every 6 (six) hours (Patient not taking: No sig reported), Disp: 20 tablet, Rfl: 0    fluticasone (FLONASE) 50 mcg/act nasal spray, 1 spray into each nostril daily (Patient not taking: No sig reported), Disp: 16 g, Rfl: 0    ibuprofen (MOTRIN) 400 mg tablet, Take by mouth every 6 (six) hours as needed for mild pain (Patient not taking: No sig reported), Disp: , Rfl:     medroxyPROGESTERone (DEPO-PROVERA) 150 mg/mL injection, INJECT 1 ML (150 MG TOTAL) INTO THE MUSCLE EVERY 3 (THREE) MONTHS  (Patient not taking: Reported on 8/27/2022), Disp: , Rfl:     ondansetron (ZOFRAN) 4 mg tablet, Take 1 tablet (4 mg total) by mouth every 8 (eight) hours as needed for nausea or vomiting (Patient not taking: No sig reported), Disp: 20 tablet, Rfl: 0    Current Allergies     Allergies as of 08/27/2022 - Reviewed 08/27/2022   Allergen Reaction Noted    Amoxicillin Hives 08/17/2016            The following portions of the patient's history were reviewed and updated as appropriate: allergies, current medications, past family history, past medical history, past social history, past surgical history and problem list      Past Medical History:   Diagnosis Date    Patient denies medical problems        No past surgical history on file  Family History   Problem Relation Age of Onset    No Known Problems Mother     No Known Problems Father          Medications have been verified  Objective   /59   Pulse 62   Temp 98 °F (36 7 °C)   Resp 18   Ht 5' 5" (1 651 m)   Wt 70 8 kg (156 lb)   SpO2 100%   BMI 25 96 kg/m²   No LMP recorded  Physical Exam     Physical Exam  Constitutional:       General: She is not in acute distress  Appearance: Normal appearance  She is not ill-appearing  HENT:      Head: Normocephalic  Comments: There is swelling and tenderness to palpation over the right side of the face in the area of the salivary gland  No palpable crepitus or deformity       Right Ear: Tympanic membrane and ear canal normal       Left Ear: Tympanic membrane and ear canal normal       Nose: Nose normal       Mouth/Throat:      Comments: No visible dental caries or cracked teeth, no swelling or erythema noted on the upper lower gum line  Cardiovascular:      Rate and Rhythm: Normal rate and regular rhythm  Heart sounds: Normal heart sounds  No murmur heard  Pulmonary:      Effort: Pulmonary effort is normal       Breath sounds: Normal breath sounds  Neurological:      Mental Status: She is alert

## 2023-09-03 ENCOUNTER — APPOINTMENT (OUTPATIENT)
Dept: RADIOLOGY | Facility: MEDICAL CENTER | Age: 23
End: 2023-09-03

## 2023-09-03 ENCOUNTER — OFFICE VISIT (OUTPATIENT)
Dept: URGENT CARE | Facility: MEDICAL CENTER | Age: 23
End: 2023-09-03

## 2023-09-03 VITALS
HEIGHT: 65 IN | WEIGHT: 164 LBS | SYSTOLIC BLOOD PRESSURE: 112 MMHG | TEMPERATURE: 97.8 F | BODY MASS INDEX: 27.32 KG/M2 | OXYGEN SATURATION: 99 % | DIASTOLIC BLOOD PRESSURE: 61 MMHG | RESPIRATION RATE: 18 BRPM | HEART RATE: 64 BPM

## 2023-09-03 DIAGNOSIS — M54.50 ACUTE MIDLINE LOW BACK PAIN WITHOUT SCIATICA: ICD-10-CM

## 2023-09-03 DIAGNOSIS — M54.50 ACUTE MIDLINE LOW BACK PAIN WITHOUT SCIATICA: Primary | ICD-10-CM

## 2023-09-03 PROBLEM — Z34.90 EARLY STAGE OF PREGNANCY: Status: ACTIVE | Noted: 2021-04-06

## 2023-09-03 LAB
SL AMB  POCT GLUCOSE, UA: NORMAL
SL AMB LEUKOCYTE ESTERASE,UA: NORMAL
SL AMB POCT BILIRUBIN,UA: NORMAL
SL AMB POCT BLOOD,UA: NORMAL
SL AMB POCT CLARITY,UA: NORMAL
SL AMB POCT COLOR,UA: YELLOW
SL AMB POCT KETONES,UA: NORMAL
SL AMB POCT NITRITE,UA: NORMAL
SL AMB POCT PH,UA: 7.5
SL AMB POCT SPECIFIC GRAVITY,UA: 1.01
SL AMB POCT URINE HCG: NEGATIVE
SL AMB POCT URINE PROTEIN: NORMAL
SL AMB POCT UROBILINOGEN: 0.2

## 2023-09-03 PROCEDURE — 99213 OFFICE O/P EST LOW 20 MIN: CPT

## 2023-09-03 PROCEDURE — 96372 THER/PROPH/DIAG INJ SC/IM: CPT

## 2023-09-03 PROCEDURE — 81002 URINALYSIS NONAUTO W/O SCOPE: CPT

## 2023-09-03 PROCEDURE — 81025 URINE PREGNANCY TEST: CPT

## 2023-09-03 PROCEDURE — 72072 X-RAY EXAM THORAC SPINE 3VWS: CPT

## 2023-09-03 RX ORDER — KETOROLAC TROMETHAMINE 30 MG/ML
30 INJECTION, SOLUTION INTRAMUSCULAR; INTRAVENOUS ONCE
Status: COMPLETED | OUTPATIENT
Start: 2023-09-03 | End: 2023-09-03

## 2023-09-03 RX ORDER — METHYLPREDNISOLONE 4 MG/1
TABLET ORAL
Qty: 21 EACH | Refills: 0 | Status: SHIPPED | OUTPATIENT
Start: 2023-09-03

## 2023-09-03 RX ADMIN — KETOROLAC TROMETHAMINE 30 MG: 30 INJECTION, SOLUTION INTRAMUSCULAR; INTRAVENOUS at 09:42

## 2023-09-03 NOTE — LETTER
September 3, 2023     Patient: Molly Finney   YOB: 2000   Date of Visit: 9/3/2023       To Whom it May Concern:    Molly Finney was seen in my clinic on 9/3/2023. She may return to work on 09/04/2023 . If you have any questions or concerns, please don't hesitate to call.          Sincerely,          DENEEN Blanchard        CC: No Recipients

## 2023-09-03 NOTE — PATIENT INSTRUCTIONS
Take medrol dose pack as directed and may continue tylenol/icy hot as needed. Follow-up with [facility] for further management of symptoms. Report to the ER sooner if symptoms worsen or develop loss of bowel/bladder function or numbness in your groin.

## 2023-09-03 NOTE — PROGRESS NOTES
[facility]        NAME: Molly Finney is a 20 y.o. female  : 2000    MRN: [id number]  DATE: September 3, 2023  TIME: 9:56 AM    Assessment and Plan   Acute midline low back pain without sciatica [M54.50]  1. Acute midline low back pain without sciatica  POCT urine dip    POCT urine HCG    ketorolac (TORADOL) injection 30 mg    XR spine thoracic 2 vw        Urine hcg negative and POC urine negative for UTI. One-time dose of toradol given in clinic for pain. X-ray of spine reveals no acute fracture/dislocation per provider read, will follow-up with final read by radiologist. Medrol dose pack prescribed for symptoms and referral placed to [facility] for further management. Patient requesting work note. Note provided with return to work date for 23. Patient Instructions     Take medrol dose pack as directed and may continue tylenol/icy hot as needed. Follow-up with [facility] for further management of symptoms. Report to the ER sooner if symptoms worsen or develop loss of bowel/bladder function or numbness in your groin. Chief Complaint     Chief Complaint   Patient presents with   • Back Pain     Pt. With right mid to lower back pain that began about 3 days ago. Denies injury. Denies urinary symptoms. History of Present Illness       20year old female presents for evaluation of mid-low back pain that started 2-3 days ago. 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 She denies associated paresthesias or loss of bowel/bladder function. She denies any prior back injuries/surgeries. She denies associated urinary symptoms, abdominal pain, leg pain, or difficulty walking. She has taken tylenol and applied icy hot for symptoms with some relief. She rates her current pain as 7/10. Back Pain  This is a new problem. The current episode started in the past 7 days. The problem occurs constantly. The problem is unchanged. The pain is present in the lumbar spine and thoracic spine. The quality of the pain is described as aching. The pain does not radiate. The pain is at a severity of 7/10. The pain is moderate. The pain is the same all the time. The symptoms are aggravated by bending, position, twisting and standing. Stiffness is present all day. Pertinent negatives include no abdominal pain, bladder incontinence, bowel incontinence, chest pain, dysuria, fever, headaches, leg pain, numbness, paresis, paresthesias, pelvic pain, perianal numbness, tingling, weakness or weight loss. She has tried analgesics and heat for the symptoms. The treatment provided mild relief. Review of Systems   Review of Systems   Constitutional: Positive for activity change. Negative for appetite change, chills, fatigue, fever and weight loss. Respiratory: Negative for chest tightness and shortness of breath. Cardiovascular: Negative for chest pain and palpitations. Gastrointestinal: Negative for abdominal pain, bowel incontinence, constipation, diarrhea, nausea and vomiting. Genitourinary: Negative for bladder incontinence, dysuria and pelvic pain. Musculoskeletal: Positive for back pain. Negative for arthralgias, myalgias and neck pain. Skin: Negative for color change and wound. Allergic/Immunologic: Negative for environmental allergies and food allergies. Neurological: Negative for dizziness, tingling, weakness, light-headedness, numbness, headaches and paresthesias.          Current Medications       Current Outpatient Medications:   •  diphenhydrAMINE (BENADRYL) 25 mg tablet, Take 1 tablet (25 mg total) by mouth every 6 (six) hours (Patient not taking: Reported on 2/9/2020), Disp: 20 tablet, Rfl: 0  •  fluticasone (FLONASE) 50 mcg/act nasal spray, 1 spray into each nostril daily (Patient not taking: Reported on 2/9/2020), Disp: 16 g, Rfl: 0  •  ibuprofen (MOTRIN) 400 mg tablet, Take by mouth every 6 (six) hours as needed for mild pain (Patient not taking: Reported on 6/1/2022), Disp: , Rfl:   •  medroxyPROGESTERone (DEPO-PROVERA) 150 mg/mL injection, INJECT 1 ML (150 MG TOTAL) INTO THE MUSCLE EVERY 3 (THREE) MONTHS. (Patient not taking: Reported on 8/27/2022), Disp: , Rfl:   •  ondansetron (ZOFRAN) 4 mg tablet, Take 1 tablet (4 mg total) by mouth every 8 (eight) hours as needed for nausea or vomiting (Patient not taking: Reported on 6/1/2022), Disp: 20 tablet, Rfl: 0  No current facility-administered medications for this visit. Current Allergies     Allergies as of 09/03/2023 - Reviewed 09/03/2023   Allergen Reaction Noted   • Amoxicillin Hives 08/17/2016            The following portions of the patient's history were reviewed and updated as appropriate: allergies, current medications, past family history, past medical history, past social history, past surgical history and problem list.     Past Medical History:   Diagnosis Date   • Patient denies medical problems        History reviewed. No pertinent surgical history. Family History   Problem Relation Age of Onset   • No Known Problems Mother    • No Known Problems Father          Medications have been verified. Objective   /61   Pulse 64   Temp 97.8 °F (36.6 °C)   Resp 18   Ht 5' 5" (1.651 m)   Wt 74.4 kg (164 lb)   SpO2 99%   BMI 27.29 kg/m²        Physical Exam     Physical Exam  Vitals and nursing note reviewed. Constitutional:       General: She is awake. Appearance: Normal appearance. She is well-developed and normal weight. HENT:      Head: Normocephalic and atraumatic. Right Ear: Hearing normal.      Left Ear: Hearing normal.      Mouth/Throat:      Lips: Pink. Mouth: Mucous membranes are moist.   Cardiovascular:      Rate and Rhythm: Normal rate and regular rhythm. Pulses: Normal pulses. Heart sounds: Normal heart sounds. Pulmonary:      Effort: Pulmonary effort is normal.      Breath sounds: Normal breath sounds. Musculoskeletal:      Cervical back: Normal, normal range of motion and neck supple. Thoracic back: Spasms, tenderness and bony tenderness present. No swelling, edema, deformity or signs of trauma. Normal range of motion. No scoliosis. Lumbar back: Spasms, tenderness and bony tenderness present. No swelling, edema, deformity or signs of trauma. Normal range of motion. No scoliosis. Back:    Skin:     General: Skin is warm. Findings: No bruising, ecchymosis, rash or wound. Neurological:      General: No focal deficit present. Mental Status: She is alert and oriented to person, place, and time. GCS: GCS eye subscore is 4. GCS verbal subscore is 5. GCS motor subscore is 6. Psychiatric:         Mood and Affect: Mood normal.         Behavior: Behavior normal. Behavior is cooperative. Thought Content:  Thought content normal.         Judgment: Judgment normal.

## 2023-09-05 ENCOUNTER — TELEPHONE (OUTPATIENT)
Dept: PHYSICAL THERAPY | Facility: OTHER | Age: 23
End: 2023-09-05

## 2023-09-08 NOTE — TELEPHONE ENCOUNTER
Message left for patient regarding referral entered for  [facility].   Contact information, hours of operation and VM instructions provided. Requested patient to CB to discuss further. This is the second attempt to reach patient. Referral deferred for f/u attempt per protocol.

## 2023-12-10 ENCOUNTER — HOSPITAL ENCOUNTER (EMERGENCY)
Facility: HOSPITAL | Age: 23
Discharge: HOME | End: 2023-12-10
Attending: EMERGENCY MEDICINE

## 2023-12-10 ENCOUNTER — APPOINTMENT (EMERGENCY)
Dept: RADIOLOGY | Facility: HOSPITAL | Age: 23
End: 2023-12-10

## 2023-12-10 VITALS
OXYGEN SATURATION: 98 % | TEMPERATURE: 98.4 F | BODY MASS INDEX: 28.43 KG/M2 | DIASTOLIC BLOOD PRESSURE: 68 MMHG | WEIGHT: 170.86 LBS | RESPIRATION RATE: 18 BRPM | HEART RATE: 100 BPM | SYSTOLIC BLOOD PRESSURE: 126 MMHG

## 2023-12-10 DIAGNOSIS — U07.1 COVID: Primary | ICD-10-CM

## 2023-12-10 LAB
FLUAV RNA RESP QL NAA+PROBE: NEGATIVE
FLUBV RNA RESP QL NAA+PROBE: NEGATIVE
RSV RNA RESP QL NAA+PROBE: NEGATIVE
SARS-COV-2 RNA RESP QL NAA+PROBE: POSITIVE

## 2023-12-10 PROCEDURE — 99284 EMERGENCY DEPT VISIT MOD MDM: CPT | Performed by: EMERGENCY MEDICINE

## 2023-12-10 PROCEDURE — 0241U HB NFCT DS VIR RESP RNA 4 TRGT: CPT | Performed by: EMERGENCY MEDICINE

## 2023-12-10 PROCEDURE — 99284 EMERGENCY DEPT VISIT MOD MDM: CPT

## 2023-12-10 PROCEDURE — 71045 X-RAY EXAM CHEST 1 VIEW: CPT

## 2023-12-10 RX ORDER — FLUTICASONE PROPIONATE 50 MCG
1 SPRAY, SUSPENSION (ML) NASAL ONCE
Status: COMPLETED | OUTPATIENT
Start: 2023-12-10 | End: 2023-12-10

## 2023-12-10 RX ORDER — GUAIFENESIN/DEXTROMETHORPHAN 100-10MG/5
10 SYRUP ORAL ONCE
Status: COMPLETED | OUTPATIENT
Start: 2023-12-10 | End: 2023-12-10

## 2023-12-10 RX ORDER — IBUPROFEN 400 MG/1
400 TABLET ORAL ONCE
Status: COMPLETED | OUTPATIENT
Start: 2023-12-10 | End: 2023-12-10

## 2023-12-10 RX ORDER — FLUTICASONE PROPIONATE 50 MCG
1 SPRAY, SUSPENSION (ML) NASAL DAILY
Status: DISCONTINUED | OUTPATIENT
Start: 2023-12-11 | End: 2023-12-10

## 2023-12-10 RX ADMIN — GUAIFENESIN AND DEXTROMETHORPHAN 10 ML: 100; 10 SYRUP ORAL at 17:52

## 2023-12-10 RX ADMIN — IBUPROFEN 400 MG: 400 TABLET, FILM COATED ORAL at 17:51

## 2023-12-10 RX ADMIN — FLUTICASONE PROPIONATE 1 SPRAY: 50 SPRAY, METERED NASAL at 17:52

## 2023-12-10 NOTE — ED PROVIDER NOTES
History  Chief Complaint   Patient presents with    Flu Symptoms     Pt states she tested + at home for covid. Pt reports chills, fevers, congestion and a cough. Pt denies SOB or CP at this time. 18-year-old female comes in for evaluation of flulike symptoms. Patient reports chills fever congestion and a cough. Symptoms started yesterday. She denies any chest pain or shortness of breath. Patient states that she tested positive at home for COVID. 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 so that she does not have to go to work while she is infectious      History provided by:  Patient   used: No    Flu Symptoms  Presenting symptoms: cough, fever and rhinorrhea    Presenting symptoms: no diarrhea, no fatigue, no headaches and no shortness of breath    Severity:  Moderate  Onset quality:  Sudden  Duration:  1 day  Progression:  Waxing and waning  Chronicity:  New  Ineffective treatments:  OTC medications  Associated symptoms: chills, decreased appetite, decreased physical activity and nasal congestion    Associated symptoms: no ear pain    Risk factors: sick contacts    Risk factors: not elderly        Prior to Admission Medications   Prescriptions Last Dose Informant Patient Reported? Taking? diphenhydrAMINE (BENADRYL) 25 mg tablet   No No   Sig: Take 1 tablet (25 mg total) by mouth every 6 (six) hours   Patient not taking: Reported on 2020   fluticasone (FLONASE) 50 mcg/act nasal spray   No No   Si spray into each nostril daily   Patient not taking: Reported on 2020   ibuprofen (MOTRIN) 400 mg tablet   Yes No   Sig: Take by mouth every 6 (six) hours as needed for mild pain   Patient not taking: Reported on 2022   medroxyPROGESTERone (DEPO-PROVERA) 150 mg/mL injection   Yes No   Sig: INJECT 1 ML (150 MG TOTAL) INTO THE MUSCLE EVERY 3 (THREE) MONTHS.    Patient not taking: Reported on 2022   methylPREDNISolone 4 MG tablet therapy pack No No   Sig: Use as directed on package   ondansetron (ZOFRAN) 4 mg tablet   No No   Sig: Take 1 tablet (4 mg total) by mouth every 8 (eight) hours as needed for nausea or vomiting   Patient not taking: Reported on 6/1/2022      Facility-Administered Medications: None       Past Medical History:   Diagnosis Date    Patient denies medical problems        History reviewed. No pertinent surgical history. Family History   Problem Relation Age of Onset    No Known Problems Mother     No Known Problems Father      I have reviewed and agree with the history as documented. E-Cigarette/Vaping    E-Cigarette Use Never User      E-Cigarette/Vaping Substances     Social History     Tobacco Use    Smoking status: Never    Smokeless tobacco: Never   Vaping Use    Vaping Use: Never used   Substance Use Topics    Alcohol use: No    Drug use: No       Review of Systems   Constitutional:  Positive for chills, decreased appetite and fever. Negative for fatigue. HENT:  Positive for congestion and rhinorrhea. Negative for ear pain. Eyes:  Negative for discharge and redness. Respiratory:  Positive for cough. Negative for apnea, shortness of breath and wheezing. Cardiovascular:  Negative for chest pain. Gastrointestinal:  Negative for abdominal pain and diarrhea. Endocrine: Negative for cold intolerance and polydipsia. Genitourinary:  Negative for difficulty urinating and hematuria. Musculoskeletal:  Negative for arthralgias and back pain. Skin:  Negative for color change and rash. Allergic/Immunologic: Negative for environmental allergies and immunocompromised state. Neurological:  Negative for numbness and headaches. Hematological:  Negative for adenopathy. Does not bruise/bleed easily. Psychiatric/Behavioral:  Negative for agitation and behavioral problems. Physical Exam  Physical Exam  Vitals and nursing note reviewed. Constitutional:       Appearance: Normal appearance.  She is well-developed. She is not toxic-appearing. HENT:      Head: Normocephalic and atraumatic. Right Ear: Tympanic membrane and external ear normal.      Left Ear: Tympanic membrane and external ear normal.      Nose: Congestion and rhinorrhea present. No nasal deformity. Mouth/Throat:      Dentition: Normal dentition. Pharynx: Uvula midline. Eyes:      General: Lids are normal.         Right eye: No discharge. Left eye: No discharge. Conjunctiva/sclera: Conjunctivae normal.      Pupils: Pupils are equal, round, and reactive to light. Neck:      Vascular: No carotid bruit or JVD. Trachea: Trachea normal.   Cardiovascular:      Rate and Rhythm: Normal rate and regular rhythm. No extrasystoles are present. Chest Wall: PMI is not displaced. Pulses: Normal pulses. Pulmonary:      Effort: Pulmonary effort is normal. No accessory muscle usage or respiratory distress. Breath sounds: Normal breath sounds. No wheezing, rhonchi or rales. Abdominal:      General: Bowel sounds are normal.      Palpations: Abdomen is soft. Abdomen is not rigid. There is no mass. Tenderness: There is no abdominal tenderness. There is no guarding or rebound. Musculoskeletal:      Right shoulder: No swelling, deformity or bony tenderness. Normal range of motion. Cervical back: Normal range of motion and neck supple. No deformity, tenderness or bony tenderness. Lymphadenopathy:      Cervical: No cervical adenopathy. Skin:     General: Skin is warm and dry. Findings: No rash. Neurological:      Mental Status: She is alert and oriented to person, place, and time. GCS: GCS eye subscore is 4. GCS verbal subscore is 5. GCS motor subscore is 6. Cranial Nerves: No cranial nerve deficit. Sensory: No sensory deficit. Deep Tendon Reflexes: Reflexes are normal and symmetric.    Psychiatric:         Speech: Speech normal.         Behavior: Behavior normal. Vital Signs  ED Triage Vitals   Temperature Pulse Respirations Blood Pressure SpO2   12/10/23 1724 12/10/23 1724 12/10/23 1724 12/10/23 1724 12/10/23 1724   98.4 °F (36.9 °C) 100 18 126/68 98 %      Temp Source Heart Rate Source Patient Position - Orthostatic VS BP Location FiO2 (%)   12/10/23 1724 12/10/23 1724 12/10/23 1724 12/10/23 1724 --   Oral Monitor Lying Right arm       Pain Score       12/10/23 1751       4           Vitals:    12/10/23 1724   BP: 126/68   Pulse: 100   Patient Position - Orthostatic VS: Lying         Visual Acuity      ED Medications  Medications   ibuprofen (MOTRIN) tablet 400 mg (400 mg Oral Given 12/10/23 1751)   dextromethorphan-guaiFENesin (ROBITUSSIN DM) oral syrup 10 mL (10 mL Oral Given 12/10/23 1752)   fluticasone (FLONASE) 50 mcg/act nasal spray 1 spray (1 spray Each Nare Given 12/10/23 1752)       Diagnostic Studies  Results Reviewed       Procedure Component Value Units Date/Time    FLU/RSV/COVID - if FLU/RSV clinically relevant [[id number]]  (Abnormal) Collected: 12/10/23 1750    Lab Status: Final result Specimen: Nares from Nose Updated: 12/10/23 1839     SARS-CoV-2 Positive     INFLUENZA A PCR Negative     INFLUENZA B PCR Negative     RSV PCR Negative    Narrative:      FOR PEDIATRIC PATIENTS - copy/paste COVID Guidelines URL to browser: [email or web address]    SARS-CoV-2 assay is a Nucleic Acid Amplification assay intended for the  qualitative detection of nucleic acid from SARS-CoV-2 in nasopharyngeal  swabs. Results are for the presumptive identification of SARS-CoV-2 RNA. Positive results are indicative of infection with SARS-CoV-2, the virus  causing COVID-19, but do not rule out bacterial infection or co-infection  with other viruses. Laboratories within the [facility] and its  territories are required to report all positive results to the appropriate  public health authorities.  Negative results do not preclude SARS-CoV-2  infection and should not be used as the sole basis for treatment or other  patient management decisions. Negative results must be combined with  clinical observations, patient history, and epidemiological information. This test has not been FDA cleared or approved. This test has been authorized by FDA under an Emergency Use Authorization  (EUA). This test is only authorized for the duration of time the  declaration that circumstances exist justifying the authorization of the  emergency use of an in vitro diagnostic tests for detection of SARS-CoV-2  virus and/or diagnosis of COVID-19 infection under section 564(b)(1) of  the Act, 21 U. S.C. 819RVC-7(T)(9), unless the authorization is terminated  or revoked sooner. The test has been validated but independent review by FDA  and CLIA is pending. Test performed using [employer]: This RT-PCR assay targets N2,  a region unique to SARS-CoV-2. A conserved region in the E-gene was chosen  for pan-Sarbecovirus detection which includes SARS-CoV-2. According to CMS-2020-01-R, this platform meets the definition of high-throughput technology. XR chest 1 view portable    (Results Pending)              Procedures  Procedures         ED Course                                             Medical Decision Making  Differential diagnosis includes COVID flu RSV or other viral illness. Less likely pneumonia    Problems Addressed:  COVID: acute illness or injury    Amount and/or Complexity of Data Reviewed  External Data Reviewed: notes. Details: Reviewed patient's past medical history  Labs:  Decision-making details documented in ED Course. Radiology: ordered. Details: Chest x-ray within normal limits no pneumonia    Risk  OTC drugs. Prescription drug management.              Disposition  Final diagnoses:   COVID     Time reflects when diagnosis was documented in both MDM as applicable and the Disposition within this note Time User Action Codes Description Comment    12/10/2023  5:57 PM Fabiola Lion Add [U07.1] [facility]           ED Disposition       ED Disposition   Discharge    Condition   Stable    Date/Time   Sun Dec 10, 2023  5:57 PM    Comment   Cayetano Josue discharge to home/self care. Follow-up Information       Follow up With Specialties Details Why Contact Mary Armstrong MD Family Medicine Schedule an appointment as soon as possible for a visit    [facility] [id number]  [phone]              Discharge Medication List as of 12/10/2023  5:57 PM        CONTINUE these medications which have NOT CHANGED    Details   diphenhydrAMINE (BENADRYL) 25 mg tablet Take 1 tablet (25 mg total) by mouth every 6 (six) hours, Starting Sun 6/23/2019, Print      fluticasone (FLONASE) 50 mcg/act nasal spray 1 spray into each nostril daily, Starting Wed 11/7/2018, Normal      ibuprofen (MOTRIN) 400 mg tablet Take by mouth every 6 (six) hours as needed for mild pain, Historical Med      medroxyPROGESTERone (DEPO-PROVERA) 150 mg/mL injection INJECT 1 ML (150 MG TOTAL) INTO THE MUSCLE EVERY 3 (THREE) MONTHS. , Historical Med      methylPREDNISolone 4 MG tablet therapy pack Use as directed on package, Normal      ondansetron (ZOFRAN) 4 mg tablet Take 1 tablet (4 mg total) by mouth every 8 (eight) hours as needed for nausea or vomiting, Starting Sun 11/8/2020, Normal             No discharge procedures on file.     PDMP Review       None            ED Provider  Electronically Signed by             Armstead Boas, DO  12/10/23 1 Nishant Kirby DO  12/10/23 0169

## 2023-12-10 NOTE — Clinical Note
Shant Quinn was seen and treated in our emergency department on 12/10/2023. Diagnosis:     Stephanie Job  may return to work on return date. She may return on this date: 12/14/2023         If you have any questions or concerns, please don't hesitate to call.       Astrid Hickman, DO    ______________________________           _______________          _______________  Hospital Representative                              Date                                Time

## 2023-12-10 NOTE — DISCHARGE INSTRUCTIONS
Should isolate for 5 days and then if going out in public for the next 5 days she should wear a mask.